# Patient Record
Sex: FEMALE | Race: WHITE | Employment: UNEMPLOYED | URBAN - METROPOLITAN AREA
[De-identification: names, ages, dates, MRNs, and addresses within clinical notes are randomized per-mention and may not be internally consistent; named-entity substitution may affect disease eponyms.]

---

## 2019-07-25 ENCOUNTER — APPOINTMENT (OUTPATIENT)
Dept: GENERAL RADIOLOGY | Age: 24
DRG: 720 | End: 2019-07-25
Attending: EMERGENCY MEDICINE
Payer: MEDICAID

## 2019-07-25 ENCOUNTER — HOSPITAL ENCOUNTER (INPATIENT)
Age: 24
LOS: 4 days | Discharge: LEFT BEFORE DISCHARGE | DRG: 720 | End: 2019-07-29
Attending: EMERGENCY MEDICINE | Admitting: FAMILY MEDICINE
Payer: MEDICAID

## 2019-07-25 DIAGNOSIS — L03.90 CELLULITIS, UNSPECIFIED CELLULITIS SITE: Primary | ICD-10-CM

## 2019-07-25 DIAGNOSIS — A41.9 SEPSIS, DUE TO UNSPECIFIED ORGANISM: ICD-10-CM

## 2019-07-25 LAB
ALBUMIN SERPL-MCNC: 3 G/DL (ref 3.4–5)
ALBUMIN/GLOB SERPL: 0.8 {RATIO} (ref 0.8–1.7)
ALP SERPL-CCNC: 145 U/L (ref 45–117)
ALT SERPL-CCNC: 27 U/L (ref 13–56)
AMPHET UR QL SCN: NEGATIVE
ANION GAP SERPL CALC-SCNC: 7 MMOL/L (ref 3–18)
APPEARANCE UR: CLEAR
APTT PPP: 29.3 SEC (ref 23–36.4)
AST SERPL-CCNC: 47 U/L (ref 10–38)
ATRIAL RATE: 103 BPM
BACTERIA URNS QL MICRO: ABNORMAL /HPF
BARBITURATES UR QL SCN: NEGATIVE
BASOPHILS # BLD: 0 K/UL (ref 0–0.1)
BASOPHILS NFR BLD: 0 % (ref 0–2)
BENZODIAZ UR QL: NEGATIVE
BILIRUB SERPL-MCNC: 0.9 MG/DL (ref 0.2–1)
BILIRUB UR QL: NEGATIVE
BUN SERPL-MCNC: 11 MG/DL (ref 7–18)
BUN/CREAT SERPL: 13 (ref 12–20)
CALCIUM SERPL-MCNC: 8.1 MG/DL (ref 8.5–10.1)
CALCULATED P AXIS, ECG09: 68 DEGREES
CALCULATED R AXIS, ECG10: 62 DEGREES
CALCULATED T AXIS, ECG11: 47 DEGREES
CANNABINOIDS UR QL SCN: NEGATIVE
CHLORIDE SERPL-SCNC: 92 MMOL/L (ref 100–111)
CO2 SERPL-SCNC: 29 MMOL/L (ref 21–32)
COCAINE UR QL SCN: NEGATIVE
COLOR UR: YELLOW
CREAT SERPL-MCNC: 0.86 MG/DL (ref 0.6–1.3)
DIAGNOSIS, 93000: NORMAL
DIFFERENTIAL METHOD BLD: ABNORMAL
EOSINOPHIL # BLD: 0 K/UL (ref 0–0.4)
EOSINOPHIL NFR BLD: 0 % (ref 0–5)
EPITH CASTS URNS QL MICRO: ABNORMAL /LPF (ref 0–5)
ERYTHROCYTE [DISTWIDTH] IN BLOOD BY AUTOMATED COUNT: 12.7 % (ref 11.6–14.5)
GLOBULIN SER CALC-MCNC: 3.8 G/DL (ref 2–4)
GLUCOSE SERPL-MCNC: 86 MG/DL (ref 74–99)
GLUCOSE UR STRIP.AUTO-MCNC: NEGATIVE MG/DL
HCT VFR BLD AUTO: 34.8 % (ref 35–45)
HDSCOM,HDSCOM: ABNORMAL
HGB BLD-MCNC: 11.8 G/DL (ref 12–16)
HGB UR QL STRIP: NEGATIVE
INR PPP: 1.2 (ref 0.8–1.2)
KETONES UR QL STRIP.AUTO: NEGATIVE MG/DL
LACTATE BLD-SCNC: 2.48 MMOL/L (ref 0.4–2)
LEUKOCYTE ESTERASE UR QL STRIP.AUTO: ABNORMAL
LYMPHOCYTES # BLD: 0.5 K/UL (ref 0.9–3.6)
LYMPHOCYTES NFR BLD: 6 % (ref 21–52)
MCH RBC QN AUTO: 28.2 PG (ref 24–34)
MCHC RBC AUTO-ENTMCNC: 33.9 G/DL (ref 31–37)
MCV RBC AUTO: 83.1 FL (ref 74–97)
METHADONE UR QL: NEGATIVE
MONOCYTES # BLD: 0.4 K/UL (ref 0.05–1.2)
MONOCYTES NFR BLD: 4 % (ref 3–10)
NEUTS SEG # BLD: 8.3 K/UL (ref 1.8–8)
NEUTS SEG NFR BLD: 90 % (ref 40–73)
NITRITE UR QL STRIP.AUTO: NEGATIVE
OPIATES UR QL: POSITIVE
P-R INTERVAL, ECG05: 114 MS
PCP UR QL: NEGATIVE
PH UR STRIP: 6.5 [PH] (ref 5–8)
PLATELET # BLD AUTO: 178 K/UL (ref 135–420)
PMV BLD AUTO: 9.8 FL (ref 9.2–11.8)
POTASSIUM SERPL-SCNC: 3.2 MMOL/L (ref 3.5–5.5)
PROT SERPL-MCNC: 6.8 G/DL (ref 6.4–8.2)
PROT UR STRIP-MCNC: NEGATIVE MG/DL
PROTHROMBIN TIME: 14.7 SEC (ref 11.5–15.2)
Q-T INTERVAL, ECG07: 316 MS
QRS DURATION, ECG06: 80 MS
QTC CALCULATION (BEZET), ECG08: 413 MS
RBC # BLD AUTO: 4.19 M/UL (ref 4.2–5.3)
RBC #/AREA URNS HPF: NEGATIVE /HPF (ref 0–5)
SODIUM SERPL-SCNC: 128 MMOL/L (ref 136–145)
SP GR UR REFRACTOMETRY: 1.01 (ref 1–1.03)
UROBILINOGEN UR QL STRIP.AUTO: 1 EU/DL (ref 0.2–1)
VENTRICULAR RATE, ECG03: 103 BPM
WBC # BLD AUTO: 9.3 K/UL (ref 4.6–13.2)
WBC URNS QL MICRO: ABNORMAL /HPF (ref 0–5)

## 2019-07-25 PROCEDURE — 85025 COMPLETE CBC W/AUTO DIFF WBC: CPT

## 2019-07-25 PROCEDURE — 85610 PROTHROMBIN TIME: CPT

## 2019-07-25 PROCEDURE — 81001 URINALYSIS AUTO W/SCOPE: CPT

## 2019-07-25 PROCEDURE — 74011250636 HC RX REV CODE- 250/636: Performed by: EMERGENCY MEDICINE

## 2019-07-25 PROCEDURE — 96361 HYDRATE IV INFUSION ADD-ON: CPT

## 2019-07-25 PROCEDURE — 71045 X-RAY EXAM CHEST 1 VIEW: CPT

## 2019-07-25 PROCEDURE — 65660000000 HC RM CCU STEPDOWN

## 2019-07-25 PROCEDURE — 74011250637 HC RX REV CODE- 250/637: Performed by: FAMILY MEDICINE

## 2019-07-25 PROCEDURE — 80053 COMPREHEN METABOLIC PANEL: CPT

## 2019-07-25 PROCEDURE — 87186 SC STD MICRODIL/AGAR DIL: CPT

## 2019-07-25 PROCEDURE — 74011000258 HC RX REV CODE- 258: Performed by: EMERGENCY MEDICINE

## 2019-07-25 PROCEDURE — 87077 CULTURE AEROBIC IDENTIFY: CPT

## 2019-07-25 PROCEDURE — 93005 ELECTROCARDIOGRAM TRACING: CPT

## 2019-07-25 PROCEDURE — 80307 DRUG TEST PRSMV CHEM ANLYZR: CPT

## 2019-07-25 PROCEDURE — 87040 BLOOD CULTURE FOR BACTERIA: CPT

## 2019-07-25 PROCEDURE — 75810000289 HC I&D ABSCESS SIMP/COMP/MULT

## 2019-07-25 PROCEDURE — 74011000250 HC RX REV CODE- 250: Performed by: EMERGENCY MEDICINE

## 2019-07-25 PROCEDURE — 99285 EMERGENCY DEPT VISIT HI MDM: CPT

## 2019-07-25 PROCEDURE — 96374 THER/PROPH/DIAG INJ IV PUSH: CPT

## 2019-07-25 PROCEDURE — 85730 THROMBOPLASTIN TIME PARTIAL: CPT

## 2019-07-25 PROCEDURE — 83605 ASSAY OF LACTIC ACID: CPT

## 2019-07-25 PROCEDURE — 74011250637 HC RX REV CODE- 250/637: Performed by: EMERGENCY MEDICINE

## 2019-07-25 RX ORDER — LEVOFLOXACIN 5 MG/ML
750 INJECTION, SOLUTION INTRAVENOUS EVERY 24 HOURS
Status: DISCONTINUED | OUTPATIENT
Start: 2019-07-25 | End: 2019-07-25

## 2019-07-25 RX ORDER — PERMETHRIN 50 MG/G
CREAM TOPICAL
Status: COMPLETED | OUTPATIENT
Start: 2019-07-25 | End: 2019-07-25

## 2019-07-25 RX ORDER — VANCOMYCIN/0.9 % SOD CHLORIDE 1.5G/250ML
1500 PLASTIC BAG, INJECTION (ML) INTRAVENOUS ONCE
Status: COMPLETED | OUTPATIENT
Start: 2019-07-25 | End: 2019-07-25

## 2019-07-25 RX ORDER — SODIUM CHLORIDE 9 MG/ML
150 INJECTION, SOLUTION INTRAVENOUS ONCE
Status: COMPLETED | OUTPATIENT
Start: 2019-07-25 | End: 2019-07-26

## 2019-07-25 RX ORDER — ACETAMINOPHEN 500 MG
1000 TABLET ORAL ONCE
Status: COMPLETED | OUTPATIENT
Start: 2019-07-25 | End: 2019-07-25

## 2019-07-25 RX ORDER — SODIUM CHLORIDE 0.9 % (FLUSH) 0.9 %
5-10 SYRINGE (ML) INJECTION AS NEEDED
Status: DISCONTINUED | OUTPATIENT
Start: 2019-07-25 | End: 2019-07-29 | Stop reason: HOSPADM

## 2019-07-25 RX ORDER — IBUPROFEN 400 MG/1
800 TABLET ORAL ONCE
Status: COMPLETED | OUTPATIENT
Start: 2019-07-25 | End: 2019-07-25

## 2019-07-25 RX ADMIN — SODIUM CHLORIDE 500 ML: 900 INJECTION, SOLUTION INTRAVENOUS at 19:47

## 2019-07-25 RX ADMIN — SODIUM CHLORIDE 1000 ML: 900 INJECTION, SOLUTION INTRAVENOUS at 18:07

## 2019-07-25 RX ADMIN — SODIUM CHLORIDE 150 ML/HR: 900 INJECTION, SOLUTION INTRAVENOUS at 20:24

## 2019-07-25 RX ADMIN — SODIUM CHLORIDE 40 ML: 9 INJECTION, SOLUTION INTRAVENOUS at 18:30

## 2019-07-25 RX ADMIN — WATER 1 G: 1 INJECTION INTRAMUSCULAR; INTRAVENOUS; SUBCUTANEOUS at 18:57

## 2019-07-25 RX ADMIN — PERMETHRIN: 50 CREAM TOPICAL at 22:30

## 2019-07-25 RX ADMIN — ACETAMINOPHEN 1000 MG: 500 TABLET ORAL at 18:13

## 2019-07-25 RX ADMIN — IBUPROFEN 800 MG: 400 TABLET, FILM COATED ORAL at 18:13

## 2019-07-25 RX ADMIN — VANCOMYCIN HYDROCHLORIDE 1500 MG: 10 INJECTION, POWDER, LYOPHILIZED, FOR SOLUTION INTRAVENOUS at 19:30

## 2019-07-25 NOTE — ED PROVIDER NOTES
EMERGENCY DEPARTMENT HISTORY AND PHYSICAL EXAM    Date: 7/25/2019  Patient Name: Roxana Frye    History of Presenting Illness     Chief Complaint   Patient presents with    Skin Problem    Chest Pain         History Provided By: Patient      Roxana Frye is a 25 y.o. female with PMHX of of cocaine skin popping who presents to the emergency department C/O with fever tachycardia and multiple sores all of her body. Patient notes that she has felt bugs crawling all over her body the last few days and has been picking at them she notes that she noticed redness and swelling over her right arm where she has skin popped before. She is concerned there may be an abscess forming. She has a slight headache she denies changes in vision she notes slight shortness of breath she denies chest pain she denies nausea or vomiting or urinary symptoms she notes that she has these lesions all over both arms chest and legs were ever she could pick it the \"black dots\". PCP: None        Past History     Past Medical History:  History reviewed. No pertinent past medical history. Past Surgical History:  History reviewed. No pertinent surgical history. Family History:  History reviewed. No pertinent family history. Social History:  Social History     Tobacco Use    Smoking status: Current Every Day Smoker    Smokeless tobacco: Current User   Substance Use Topics    Alcohol use: Yes    Drug use: Not on file       Allergies:  No Known Allergies      Review of Systems   Review of Systems   All other systems reviewed and are negative.         Physical Exam     Vitals:    07/25/19 1835 07/25/19 1839 07/25/19 1900 07/25/19 1922   BP: 111/65  100/51    Pulse: (!) 104 (!) 105 (!) 102    Resp: 26 21 16    Temp:    100.1 °F (37.8 °C)   SpO2: 99%      Weight:       Height:         Physical Exam    Nursing notes and vital signs reviewed    Constitutional: moderate acute distress  Head: Normocephalic, Atraumatic  Eyes: Pupils are equal, round, and reactive to light, EOMI  Neck: Supple  Cardiovascular: tachycardic, no murmurs, rubs, or gallops  Chest: Normal work of breathing and chest excursion bilaterally  Lungs: Clear to ausculation bilaterally  Abdomen: Soft, non tender, non distended,  Back: No evidence of trauma or deformity  Extremities: no LE edema  Skin: Multiple lesions of skin breakage and redness on bilateral upper extremities bilateral lower extremities face chest and groin. Many becoming cellulitic worst area of cellulitis on the right arm with small possible abscess. Neuro: Alert and appropriate  Psychiatric: Anxious and jittery      Diagnostic Study Results     Labs -     Recent Results (from the past 12 hour(s))   EKG, 12 LEAD, INITIAL    Collection Time: 07/25/19  6:04 PM   Result Value Ref Range    Ventricular Rate 103 BPM    Atrial Rate 103 BPM    P-R Interval 114 ms    QRS Duration 80 ms    Q-T Interval 316 ms    QTC Calculation (Bezet) 413 ms    Calculated P Axis 68 degrees    Calculated R Axis 62 degrees    Calculated T Axis 47 degrees    Diagnosis       Poor data quality, interpretation may be adversely affected  Sinus tachycardia  Otherwise normal ECG  Confirmed by Kelly Cai MD, [facility] ([id number]) on 7/25/2019 2:13:51 PM     METABOLIC PANEL, COMPREHENSIVE    Collection Time: 07/25/19  6:07 PM   Result Value Ref Range    Sodium 128 (L) 136 - 145 mmol/L    Potassium 3.2 (L) 3.5 - 5.5 mmol/L    Chloride 92 (L) 100 - 111 mmol/L    CO2 29 21 - 32 mmol/L    Anion gap 7 3.0 - 18 mmol/L    Glucose 86 74 - 99 mg/dL    BUN 11 7.0 - 18 MG/DL    Creatinine 0.86 0.6 - 1.3 MG/DL    BUN/Creatinine ratio 13 12 - 20      GFR est AA >60 >60 ml/min/1.73m2    GFR est non-AA >60 >60 ml/min/1.73m2    Calcium 8.1 (L) 8.5 - 10.1 MG/DL    Bilirubin, total 0.9 0.2 - 1.0 MG/DL    ALT (SGPT) 27 13 - 56 U/L    AST (SGOT) 47 (H) 10 - 38 U/L    Alk.  phosphatase 145 (H) 45 - 117 U/L    Protein, total 6.8 6.4 - 8.2 g/dL    Albumin 3.0 (L) 3.4 - 5.0 g/dL Globulin 3.8 2.0 - 4.0 g/dL    A-G Ratio 0.8 0.8 - 1.7     CBC WITH AUTOMATED DIFF    Collection Time: 07/25/19  6:07 PM   Result Value Ref Range    WBC 9.3 4.6 - 13.2 K/uL    RBC 4.19 (L) 4.20 - 5.30 M/uL    HGB 11.8 (L) 12.0 - 16.0 g/dL    HCT 34.8 (L) 35.0 - 45.0 %    MCV 83.1 74.0 - 97.0 FL    MCH 28.2 24.0 - 34.0 PG    MCHC 33.9 31.0 - 37.0 g/dL    RDW 12.7 11.6 - 14.5 %    PLATELET 766 308 - 934 K/uL    MPV 9.8 9.2 - 11.8 FL    NEUTROPHILS 90 (H) 40 - 73 %    LYMPHOCYTES 6 (L) 21 - 52 %    MONOCYTES 4 3 - 10 %    EOSINOPHILS 0 0 - 5 %    BASOPHILS 0 0 - 2 %    ABS. NEUTROPHILS 8.3 (H) 1.8 - 8.0 K/UL    ABS. LYMPHOCYTES 0.5 (L) 0.9 - 3.6 K/UL    ABS. MONOCYTES 0.4 0.05 - 1.2 K/UL    ABS. EOSINOPHILS 0.0 0.0 - 0.4 K/UL    ABS.  BASOPHILS 0.0 0.0 - 0.1 K/UL    DF AUTOMATED     PROTHROMBIN TIME + INR    Collection Time: 07/25/19  6:07 PM   Result Value Ref Range    Prothrombin time 14.7 11.5 - 15.2 sec    INR 1.2 0.8 - 1.2     PTT    Collection Time: 07/25/19  6:07 PM   Result Value Ref Range    aPTT 29.3 23.0 - 36.4 SEC   POC LACTIC ACID    Collection Time: 07/25/19  6:09 PM   Result Value Ref Range    Lactic Acid (POC) 2.48 (HH) 0.40 - 2.00 mmol/L       Radiologic Studies -   XR CHEST PORT    (Results Pending)     CT Results  (Last 48 hours)    None        CXR Results  (Last 48 hours)    None          Medications given in the ED-  Medications   sodium chloride (NS) flush 5-10 mL (has no administration in time range)   cefTRIAXone (ROCEPHIN) 1 g in sterile water (preservative free) 10 mL IV syringe (1 g IntraVENous Push 7/25/19 1857)   vancomycin (VANCOCIN) 1500 mg in  ml infusion (1,500 mg IntraVENous New Bag 7/25/19 1930)   sodium chloride 0.9 % bolus infusion 500 mL (500 mL IntraVENous New Bag 7/25/19 1947)   0.9% sodium chloride infusion (has no administration in time range)   vancomycin (VANCOCIN) 1,000 mg in 0.9% sodium chloride 250 mL IVPB (has no administration in time range)   sodium chloride 0.9 % bolus infusion 1,000 mL (0 mL IntraVENous IV Completed 7/25/19 1845)     Followed by   sodium chloride 0.9 % bolus infusion 1,000 mL (0 mL IntraVENous IV Completed 7/25/19 1855)     Followed by   sodium chloride 0.9 % bolus infusion 40 mL (40 mL IntraVENous Push 7/25/19 1830)   acetaminophen (TYLENOL) tablet 1,000 mg (1,000 mg Oral Given 7/25/19 1813)   ibuprofen (MOTRIN) tablet 800 mg (800 mg Oral Given 7/25/19 1813)         Medical Decision Making   I am the first provider for this patient. I reviewed the vital signs, available nursing notes, past medical history, past surgical history, family history and social history. Vital Signs-Reviewed the patient's vital signs. Records Reviewed: Nursing Notes    Provider Notes (Medical Decision Making): Junior Lynne is a 25 y.o. female with a history of cocaine skin popping who presented today septic. The patient was brought back and evaluated she had evidence of cellulitis on bilateral upper extremities and lower extremities worse area in the right upper extremity. There was an area of possible fluctuance. Patient was started on the septic bundle lactate was elevated blood cultures were sent x-ray showed no lobar pneumonia. She was started on antibiotics including Vanco and ceftriaxone. A 30 cc/kg bolus was ordered. Patient's vitals started to improve she was given Tylenol and Motrin for her fever she will be admitted to telemetry to Dr. Chaya Sanchez for further management.     I appreciated the help of Dr. Danitza Smalls in the care of this patient for the I&D    Procedures:  I&D Abcess Simple  Date/Time: 7/25/2019 8:16 PM  Performed by: Rob Farias DO  Authorized by: Rob Farias DO     Consent:     Consent obtained:  Verbal    Consent given by:  Patient    Risks discussed:  Bleeding, incomplete drainage, pain, infection and damage to other organs    Alternatives discussed:  No treatment  Location:     Type:  Abscess Location:  Upper extremity    Upper extremity location:  Arm    Arm location:  R lower arm  Pre-procedure details:     Skin preparation:  Chloraprep  Anesthesia (see MAR for exact dosages): Anesthesia method:  Local infiltration    Local anesthetic:  Lidocaine 1% w/o epi  Procedure type:     Complexity:  Simple  Procedure details:     Needle aspiration: no      Incision types:  Stab incision and cruciate    Incision depth:  Subcutaneous    Scalpel blade:  11    Wound management:  Probed and deloculated and irrigated with saline    Drainage:  Bloody and purulent    Drainage amount: Moderate    Packing materials:  None  Post-procedure details:     Patient tolerance of procedure: Tolerated well, no immediate complications            Diagnosis and Disposition         Core Measures:  For Hospitalized Patients:    1. Hospitalization Decision Time:  The decision to hospitalize the patient was made by Milana Matthews at 1900 on 7/25/2019    2. Aspirin: Aspirin was not given because the patient did not present with a stroke at the time of their Emergency Department evaluation    8:06 PM  Patient is being admitted to the hospital by Ashok Raya. The results of their tests and reasons for their admission have been discussed with them and/or available family. They convey agreement and understanding for the need to be admitted and for their admission diagnosis. CONDITIONS ON ADMISSION:  Sepsis is present at the time of admission. Deep Vein Thrombosis is not present at the time of admission. Thrombosis is not present at the time of admission. Pneumonia is not present at the time of admission. MRSA is not present at the time of admission. Wound infection is present at the time of admission. Pressure Ulcer is not present at the time of admission. CLINICAL IMPRESSION:    1. Cellulitis, unspecified cellulitis site    2.  Sepsis, due to unspecified organism ([facility]      _______________________________      Please note that this dictation was completed with [employer], the [employer] voice recognition software. Quite often unanticipated grammatical, syntax, homophones, and other interpretive errors are inadvertently transcribed by the computer software. Please disregard these errors. Please excuse any errors that have escaped final proofreading.

## 2019-07-25 NOTE — PROGRESS NOTES
Pharmacy Dosing Services:     Consult for Vancomycin Dosing by Pharmacy by Dr. Rony Colin,  Consult provided for this 25y.o. year old female , for indication of skin/soft tissue infection. Day of Therapy 1    Ht Readings from Last 1 Encounters:   07/25/19 165.1 cm (65\")        Wt Readings from Last 1 Encounters:   07/25/19 68 kg (150 lb)        Previous Regimen N/A   Last Level N/A   Other Current Antibiotics Ceftriaxone 1 GM Q24H   Significant Cultures pending   Serum Creatinine Lab Results   Component Value Date/Time    Creatinine 0.86 07/25/2019 06:07 PM        Creatinine Clearance Estimated Creatinine Clearance: 90.8 mL/min (based on SCr of 0.86 mg/dL). BUN Lab Results   Component Value Date/Time    BUN 11 07/25/2019 06:07 PM        WBC Lab Results   Component Value Date/Time    WBC 9.3 07/25/2019 06:07 PM        H/H Lab Results   Component Value Date/Time    HGB 11.8 (L) 07/25/2019 06:07 PM        Platelets Lab Results   Component Value Date/Time    PLATELET 335 10/69/0435 06:07 PM        Temp 100.1 °F (37.8 °C)     Start Vancomycin therapy, with loading dose of 1500 (mg) at 19:30 on 7/25/2019 (time/date). Follow with maintenance dose of 1000(mg) at 07:30/19:30 (time/date), every 12  hours (frequency). Dose calculated to approximate a therapeutic trough of 15-20 mcg/mL. Pharmacy to follow daily and will make changes to dose and/or frequency based on clinical status.       Pharmacist Jm Rosales, [facility]

## 2019-07-25 NOTE — ED TRIAGE NOTES
Patient with abscess to the right arm. Patient also with marks covering her entire body from picking.

## 2019-07-26 LAB
ANION GAP SERPL CALC-SCNC: 7 MMOL/L (ref 3–18)
BUN SERPL-MCNC: 10 MG/DL (ref 7–18)
BUN/CREAT SERPL: 14 (ref 12–20)
CALCIUM SERPL-MCNC: 7.1 MG/DL (ref 8.5–10.1)
CHLORIDE SERPL-SCNC: 107 MMOL/L (ref 100–111)
CK MB CFR SERPL CALC: 0.6 % (ref 0–4)
CK MB SERPL-MCNC: 1.8 NG/ML (ref 5–25)
CK SERPL-CCNC: 298 U/L (ref 26–192)
CO2 SERPL-SCNC: 24 MMOL/L (ref 21–32)
CREAT SERPL-MCNC: 0.73 MG/DL (ref 0.6–1.3)
GLUCOSE SERPL-MCNC: 109 MG/DL (ref 74–99)
LACTATE SERPL-SCNC: 1.4 MMOL/L (ref 0.4–2)
POTASSIUM SERPL-SCNC: 3.3 MMOL/L (ref 3.5–5.5)
SODIUM SERPL-SCNC: 138 MMOL/L (ref 136–145)
TROPONIN I SERPL-MCNC: <0.02 NG/ML (ref 0–0.04)

## 2019-07-26 PROCEDURE — 82550 ASSAY OF CK (CPK): CPT

## 2019-07-26 PROCEDURE — 74011250636 HC RX REV CODE- 250/636: Performed by: EMERGENCY MEDICINE

## 2019-07-26 PROCEDURE — 74011250636 HC RX REV CODE- 250/636: Performed by: FAMILY MEDICINE

## 2019-07-26 PROCEDURE — 77030011256 HC DRSG MEPILEX <16IN NO BORD MOLN -A

## 2019-07-26 PROCEDURE — 74011250637 HC RX REV CODE- 250/637: Performed by: FAMILY MEDICINE

## 2019-07-26 PROCEDURE — 36415 COLL VENOUS BLD VENIPUNCTURE: CPT

## 2019-07-26 PROCEDURE — 80048 BASIC METABOLIC PNL TOTAL CA: CPT

## 2019-07-26 PROCEDURE — C9113 INJ PANTOPRAZOLE SODIUM, VIA: HCPCS | Performed by: FAMILY MEDICINE

## 2019-07-26 PROCEDURE — 83605 ASSAY OF LACTIC ACID: CPT

## 2019-07-26 PROCEDURE — 65660000000 HC RM CCU STEPDOWN

## 2019-07-26 PROCEDURE — 74011000250 HC RX REV CODE- 250: Performed by: FAMILY MEDICINE

## 2019-07-26 RX ORDER — OXYCODONE AND ACETAMINOPHEN 5; 325 MG/1; MG/1
1 TABLET ORAL
Status: DISCONTINUED | OUTPATIENT
Start: 2019-07-26 | End: 2019-07-29 | Stop reason: HOSPADM

## 2019-07-26 RX ORDER — SODIUM CHLORIDE 9 MG/ML
150 INJECTION, SOLUTION INTRAVENOUS ONCE
Status: COMPLETED | OUTPATIENT
Start: 2019-07-26 | End: 2019-07-26

## 2019-07-26 RX ORDER — NICOTINE 7MG/24HR
1 PATCH, TRANSDERMAL 24 HOURS TRANSDERMAL DAILY
Status: DISCONTINUED | OUTPATIENT
Start: 2019-07-26 | End: 2019-07-29 | Stop reason: HOSPADM

## 2019-07-26 RX ADMIN — OXYCODONE HYDROCHLORIDE AND ACETAMINOPHEN 1 TABLET: 5; 325 TABLET ORAL at 17:38

## 2019-07-26 RX ADMIN — VANCOMYCIN HYDROCHLORIDE 1000 MG: 1 INJECTION, POWDER, LYOPHILIZED, FOR SOLUTION INTRAVENOUS at 07:48

## 2019-07-26 RX ADMIN — PANTOPRAZOLE SODIUM 40 MG: 40 INJECTION, POWDER, LYOPHILIZED, FOR SOLUTION INTRAVENOUS at 08:44

## 2019-07-26 RX ADMIN — OXYCODONE HYDROCHLORIDE AND ACETAMINOPHEN 1 TABLET: 5; 325 TABLET ORAL at 00:55

## 2019-07-26 RX ADMIN — SODIUM CHLORIDE 150 ML/HR: 900 INJECTION, SOLUTION INTRAVENOUS at 05:06

## 2019-07-26 RX ADMIN — VANCOMYCIN HYDROCHLORIDE 1000 MG: 1 INJECTION, POWDER, LYOPHILIZED, FOR SOLUTION INTRAVENOUS at 22:11

## 2019-07-26 RX ADMIN — OXYCODONE HYDROCHLORIDE AND ACETAMINOPHEN 1 TABLET: 5; 325 TABLET ORAL at 07:46

## 2019-07-26 RX ADMIN — CEFTRIAXONE 1 G: 1 INJECTION, POWDER, FOR SOLUTION INTRAMUSCULAR; INTRAVENOUS at 17:38

## 2019-07-26 NOTE — H&P
History & Physical    Patient: Junior Lynne MRN: [id number]  CSN: [id number]    YOB: 1995  Age: 25 y.o. Sex: female      DOA: 7/25/2019  Primary Care Provider:  None      Assessment/Plan     Patient Active Problem List   Diagnosis Code    Cellulitis L03.90    Sepsis ([facility] A41.9       24 y/o female who has been skin popping cocaine for 2 years is admitted for R forearm abscess and cellulitis with hypotension possibly due to sepsis. S/p I/D in ER. She also has scabies vs. bedbug infestation. Will do screening troponin to ensure neg given tobacco use and cocaine use although her chest wall pain is reproducible.    -treat with rocephin and vancomycin  -percocet prn for abscess pain  -liberal fluid hydration, monitor MAPs and urine output closely  -nicotine patch as pt is actively smoking  -permethrin cream from the neck down after shower tonight, wash off in AM  -f/u troponin    Prophy-SCDs, protonix, no heparin due to bleeding from I/D site    Estimated length of stay : 2 nights    Hanna Gonzales MD  Nocturnist    ------------------------------------------------------------------------------------------------------------------------------------    CC: R forearm infection       HPI:     Junior Lynne is a 25 y.o. female who endorses a history of skin popping cocaine for 2 years. Denies other drug use. Worsening infection in R forearm over the course of the week and also has had bedbug or scabies infection. No prior hx of staph infection/abscess. Has had fever today. Is s/p I/D of R forearm abscess in ER today. She endorses pain with palpation of her R lower ribcage but denies any trauma. History reviewed. No pertinent past medical history. History reviewed. No pertinent surgical history. History reviewed. No pertinent family history.     Social History     Socioeconomic History    Marital status: SINGLE     Spouse name: Not on file    Number of children: Not on file    Years of education: Not on file    Highest education level: Not on file   Tobacco Use    Smoking status: Current Every Day Smoker    Smokeless tobacco: Current User   Substance and Sexual Activity    Alcohol use: Yes       Prior to Admission medications    Not on File       No Known Allergies    Review of Systems  Gen: No fever, chills, malaise, weight loss/gain. Heent: No headache, rhinorrhea, epistaxis, ear pain, hearing loss, sinus pain, neck pain/stiffness, sore throat. Heart: +chest wall pain with palpation; denies palpitations, VIDALES, pnd, or orthopnea. Resp: No cough, hemoptysis, wheezing and shortness of breath. GI: No nausea, vomiting, diarrhea, constipation, melena or hematochezia. : No urinary obstruction, dysuria or hematuria. Derm: No rash, new skin lesion or pruritis. Musc/skeletal: no bone or joint complaints. Vasc: No edema, cyanosis or claudication. Endo: No heat/cold intolerance, no polyuria,polydipsia or polyphagia. Neuro: No unilateral weakness, numbness, tingling. No seizures. Heme: No easy bruising or bleeding. Physical Exam:     Physical Exam:  Visit Vitals  BP 90/42 (BP 1 Location: Left arm, BP Patient Position: Sitting)   Pulse 88   Temp 97.8 °F (36.6 °C)   Resp 18   Ht 5' 5\" (1.651 m)   Wt 68 kg (150 lb)   LMP 2019   SpO2 98%   BMI 24.96 kg/m²      O2 Device: Room air    Temp (24hrs), Av.7 °F (37.6 °C), Min:97.8 °F (36.6 °C), Max:101.3 °F (38.5 °C)    1901 -  0700  In: 650 [I.V.:650]  Out: 500 [Urine:500]   701 - 1900  In: 2000 [I.V.:2000]  Out: -     General:  Awake, cooperative, no distress, covered in scabs from face to feet. Head:  Normocephalic, without obvious abnormality, atraumatic. Eyes:  Conjunctivae/corneas clear, sclera anicteric, PERRL. Neck: Supple, symmetrical, trachea midline, no adenopathy. Lungs:   Clear to auscultation bilaterally.        Heart:  Regular rate and rhythm, S1, S2 normal, no murmur, click, rub or gallop. Chest wall with tenderness to palpation over R inferior side. Abdomen: Soft, non-tender. Bowel sounds normal. No masses,  No organomegaly. Extremities: Extremities normal, atraumatic, no cyanosis or edema. Capillary refill normal.   Pulses: 2+ and symmetric all extremities. Skin: Skin color pink, turgor normal. Covered in excoriations/scabs from face to feet. Pressure wrap in place on R forearm due to oozing from abscess I/D   Neurologic: No focal motor or sensory deficit. Labs Reviewed: All lab results for the last 24 hours reviewed. Recent Results (from the past 24 hour(s))   EKG, 12 LEAD, INITIAL    Collection Time: 07/25/19  6:04 PM   Result Value Ref Range    Ventricular Rate 103 BPM    Atrial Rate 103 BPM    P-R Interval 114 ms    QRS Duration 80 ms    Q-T Interval 316 ms    QTC Calculation (Bezet) 413 ms    Calculated P Axis 68 degrees    Calculated R Axis 62 degrees    Calculated T Axis 47 degrees    Diagnosis       Poor data quality, interpretation may be adversely affected  Sinus tachycardia  Otherwise normal ECG  Confirmed by Derik Carrillo MD, [facility] ([id number]) on 7/25/2019 8:50:74 PM     METABOLIC PANEL, COMPREHENSIVE    Collection Time: 07/25/19  6:07 PM   Result Value Ref Range    Sodium 128 (L) 136 - 145 mmol/L    Potassium 3.2 (L) 3.5 - 5.5 mmol/L    Chloride 92 (L) 100 - 111 mmol/L    CO2 29 21 - 32 mmol/L    Anion gap 7 3.0 - 18 mmol/L    Glucose 86 74 - 99 mg/dL    BUN 11 7.0 - 18 MG/DL    Creatinine 0.86 0.6 - 1.3 MG/DL    BUN/Creatinine ratio 13 12 - 20      GFR est AA >60 >60 ml/min/1.73m2    GFR est non-AA >60 >60 ml/min/1.73m2    Calcium 8.1 (L) 8.5 - 10.1 MG/DL    Bilirubin, total 0.9 0.2 - 1.0 MG/DL    ALT (SGPT) 27 13 - 56 U/L    AST (SGOT) 47 (H) 10 - 38 U/L    Alk.  phosphatase 145 (H) 45 - 117 U/L    Protein, total 6.8 6.4 - 8.2 g/dL    Albumin 3.0 (L) 3.4 - 5.0 g/dL    Globulin 3.8 2.0 - 4.0 g/dL    A-G Ratio 0.8 0.8 - 1.7     CBC WITH AUTOMATED DIFF    Collection Time: 07/25/19  6:07 PM   Result Value Ref Range    WBC 9.3 4.6 - 13.2 K/uL    RBC 4.19 (L) 4.20 - 5.30 M/uL    HGB 11.8 (L) 12.0 - 16.0 g/dL    HCT 34.8 (L) 35.0 - 45.0 %    MCV 83.1 74.0 - 97.0 FL    MCH 28.2 24.0 - 34.0 PG    MCHC 33.9 31.0 - 37.0 g/dL    RDW 12.7 11.6 - 14.5 %    PLATELET 891 845 - 520 K/uL    MPV 9.8 9.2 - 11.8 FL    NEUTROPHILS 90 (H) 40 - 73 %    LYMPHOCYTES 6 (L) 21 - 52 %    MONOCYTES 4 3 - 10 %    EOSINOPHILS 0 0 - 5 %    BASOPHILS 0 0 - 2 %    ABS. NEUTROPHILS 8.3 (H) 1.8 - 8.0 K/UL    ABS. LYMPHOCYTES 0.5 (L) 0.9 - 3.6 K/UL    ABS. MONOCYTES 0.4 0.05 - 1.2 K/UL    ABS. EOSINOPHILS 0.0 0.0 - 0.4 K/UL    ABS.  BASOPHILS 0.0 0.0 - 0.1 K/UL    DF AUTOMATED     PROTHROMBIN TIME + INR    Collection Time: 07/25/19  6:07 PM   Result Value Ref Range    Prothrombin time 14.7 11.5 - 15.2 sec    INR 1.2 0.8 - 1.2     PTT    Collection Time: 07/25/19  6:07 PM   Result Value Ref Range    aPTT 29.3 23.0 - 36.4 SEC   POC LACTIC ACID    Collection Time: 07/25/19  6:09 PM   Result Value Ref Range    Lactic Acid (POC) 2.48 (HH) 0.40 - 2.00 mmol/L   URINALYSIS W/ RFLX MICROSCOPIC    Collection Time: 07/25/19  8:33 PM   Result Value Ref Range    Color YELLOW      Appearance CLEAR      Specific gravity 1.008 1.005 - 1.030      pH (UA) 6.5 5.0 - 8.0      Protein NEGATIVE  NEG mg/dL    Glucose NEGATIVE  NEG mg/dL    Ketone NEGATIVE  NEG mg/dL    Bilirubin NEGATIVE  NEG      Blood NEGATIVE  NEG      Urobilinogen 1.0 0.2 - 1.0 EU/dL    Nitrites NEGATIVE  NEG      Leukocyte Esterase MODERATE (A) NEG     DRUG SCREEN, URINE    Collection Time: 07/25/19  8:33 PM   Result Value Ref Range    BENZODIAZEPINES NEGATIVE  NEG      BARBITURATES NEGATIVE  NEG      THC (TH-CANNABINOL) NEGATIVE  NEG      OPIATES POSITIVE (A) NEG      PCP(PHENCYCLIDINE) NEGATIVE  NEG      COCAINE NEGATIVE  NEG      AMPHETAMINES NEGATIVE  NEG      METHADONE NEGATIVE  NEG      HDSCOM (NOTE)    URINE MICROSCOPIC ONLY    Collection Time: 07/25/19 8:33 PM   Result Value Ref Range    WBC 0 to 3 0 - 5 /hpf    RBC NEGATIVE  0 - 5 /hpf    Epithelial cells FEW 0 - 5 /lpf    Bacteria FEW (A) NEG /hpf       CXR pending

## 2019-07-26 NOTE — CDMP QUERY
This patient has been diagnosed with Sepsis. Please explicitly link all related/associated Acute Organ Dysfunction that supports this diagnosis by documenting \"associated with\".     =>Hyperlactatemia associated with sepsis  =>Other explanation of clinical findings  =>Clinically Undetermined (no explanation for clinical findings)    Risk Factors: Sepsis    Clinical Indicators: POC lactic acid 2.48; serum lactic acid after Abx 1.4    Treatment: Vanco; Rocephin      Explicitly link all related/associated Acute Organ Dysfunction to Sepsis:      Encephalopathy  Sepsis-induced Hypotension (or)  Septic Shock           [SBP < 90 (or) MAP <65 (or) SBP drop > 40 points from baseline]  Hypoxemia (or)  Acute Respiratory Failure            [need for invasive or non-invasive ventilation OR- pO2 < 60 mmHg]  Acute Kidney Injury (or) Acute Renal Failure OR- Oliguria             [serum Creatinine > 2.0 OR- Urine Output < 0.5ml/kg/hr for 2 hours]  Ileus (absent bowel sounds)  Coagulopathy  DIC  Thrombocytopenia             [Platelet Count < 899,552 OR- INR > 1.5 OR- aPTT >60 sec]  Hyperbilirubinemia     [Bilirubin > 2 mg/dL]  Hyperlactatemia   [Lactic Acid > 2.0]  Critical-Illness Myopathy or Polyneuropathy    Severe Sepsis = Sepsis with associated Acute Organ Dysfunction    Noris Jean RN/CCDS  [phone]

## 2019-07-26 NOTE — CDMP QUERY
Patient admitted with sepsis, noted to have a serum sodium level of 128.  If possible, please document in progress notes and d/c summary if you are evaluating and/or treating any of the following:     Hyponatremia   Other explanation-please specify   Unable to Determine    The medical record reflects the following:    Risk Factors:  Acute illness    Clinical Indicators: Serum sodium of 128    Treatment: NS @ 859 mL/hr; metabolic panel ordered    Yvonne Hernandez RN/CCDS  [phone]

## 2019-07-26 NOTE — PROGRESS NOTES
INITIAL NUTRITION ASSESSMENT     RECOMMENDATIONS/PLAN:   MST screen error  Monitor labs/lytes, PO intakes, skin integrity, wt, fluid status, BM    REASON FOR ASSESSMENT:     []  RN Referral:    [x] MST score >/=2  Malnutrition Screening Tool (MST):  Recently Lost Weight Without Trying: No  If Yes, How Much Weight Loss: Unsure     MST Score: 2         NUTRITION ASSESSMENT:   Client History: 25 yrs old Female admitted with sepsis w/ GPC cluster bacteremia 2/2 abscess from skip popping, scabies, cocaine abuse      PMHx: cellulitis, sepsis    Cultural/Yazidi Food Preferences: None Identified    FOOD/NUTRITION HISTORY  Diet History: pt is eating junk food in room per nursing report    Food Allergies:  [x] NKFA      Pertinent PTA Medications: none on file     NUTRITION INTAKE   Diet Order:  Regular     Average PO Intake:       No data found. Pertinent Medications:  [x] Reviewed; protonix   Electrolyte Replacement Protocol: []K  []Mg  []PO4    Insulin:  [] SSI  [] Pre-meal   []  Basal   [] Drip  [] None  Pt expected to meet estimated nutrient needs through next review:          [x]  Yes     [] No;  ANTHROPOMETRICS  Height: 5' 5\" (165.1 cm)       Weight: 68.6 kg (151 lb 3.8 oz)    BMI: 25.2 kg/m^2  -  overweight (25.0%-29.9% BMI)        Weight change: no wt hx in chart                                   Comparison to Reference Standards:  IBW: 125 lbs      %IBW: 121%      AdjBW:n/a    NUTRITION-FOCUSED PHYSICAL ASSESSMENT  Skin: No PU. GI: No BM    BIOCHEMICAL DATA & MEDICAL TESTS  Pertinent Labs:  [x] Reviewed;     NUTRITION PRESCRIPTION  Calories: 2072 kcal/day based on [place] x 1.8  Protein:  g/day based on 1.2-1.5 g/kg  Fluid: 2072 ml/day based on 1 kcal/ml      NUTRITION DIAGNOSES:   1. No nutritional problems at this time     NUTRITION INTERVENTIONS:   INTERVENTIONS:        GOALS:  1. Other: continue w/ POC 1.  Encourage PO intake >50% at most meals by next review 7 days     LEARNING NEEDS (Diet, Supplementation, Food/Nutrient-Drug Interaction):   [] None Identified  [] Inpatient education provided/documented    [] Identified and patient:  [] Declined     [x] Was not appropriate/indicated  NUTRITION MONITORING /EVALUATION:   Follow PO intake  Monitor wt  Monitor renal labs, electrolytes, fluid status  Monitor for additional supplement needs    [] Participated in Interdisciplinary Rounds  [x] Interdisciplinary Care Plan Reviewed/Documented  DISCHARGE NUTRITION RECOMMENDATIONS ADDRESSED:     [x] Yes- recommended Regular diet     NUTRITION RISK:     [x]  At risk                     []  Not currently at risk     Will follow-up per policy.   Chyna Gayle 1

## 2019-07-26 NOTE — PROGRESS NOTES
Reason for Admission:   Sepsis, scabies, cocaine abuse. RRAT Score:          4           Plan for utilizing home health:      tbd                    Current Advanced Directive/Advance Care Plan:                          Transition of Care Plan:    Chart reviewed, spoke with pt on phone in room, noting isolation for scabies vs bed bug infestation. Pt admitted from home, lives in apartment with room mate, mother lives in [place]. Pt has been screened and has Medicaid application pending. Pt will need PCP at discharge, will follow for additional needs. Care Management Interventions  PCP Verified by CM:  Yes  Transition of Care Consult (CM Consult): Discharge Planning  Current Support Network: Own Home  Confirm Follow Up Transport: Friends  Plan discussed with Pt/Family/Caregiver: Yes  Discharge Location  Discharge Placement: Home

## 2019-07-26 NOTE — PROGRESS NOTES
Bedside and Verbal shift change report given to Debra Dorantes (oncoming nurse) by Ezra Neri RN   (offgoing nurse).  Report included the following information SBAR, Kardex, Intake/Output, MAR, Recent Results and Cardiac Rhythm st.

## 2019-07-26 NOTE — PROGRESS NOTES
Assumed care of patient in wheelchair,alert and in no distress. Placed in contact isolation, Complaining of right arm pain and chest pain, call bell within reach. 11:11 PM  Talking on phone PIV restarted, patient pulled first 2 piv's out, iv solution restarted. 4:55 AM   Awake quiet, remains on room air, minimal drainage from wound site. Dressing change. MD notified of IV fluids completion.  Patient states pain remains 7/10

## 2019-07-26 NOTE — ROUTINE PROCESS
TRANSFER - IN REPORT:    Verbal report received from [address] RN(name) on [address]  being received from emergency(unit) for routine progression of care      Report consisted of patients Situation, Background, Assessment and   Recommendations(SBAR). Information from the following report(s) SBAR, ED Summary, Intake/Output, MAR, Recent Results and Cardiac Rhythm sinus tach was reviewed with the receiving nurse. Opportunity for questions and clarification was provided. Assessment  Will be completed upon patients arrival to unit and care assumed.

## 2019-07-26 NOTE — ROUTINE PROCESS
TRANSFER - OUT REPORT:    Verbal report given to Virgilio Chaparro RN(name) on Jacque Mclaughlin  being transferred to Tele(unit) for routine progression of care       Report consisted of patients Situation, Background, Assessment and   Recommendations(SBAR). Information from the following report(s) SBAR, ED Summary, Procedure Summary, Intake/Output, MAR, Recent Results and Cardiac Rhythm sinus Tach was reviewed with the receiving nurse. Lines:   Peripheral IV 07/25/19 Left Antecubital (Active)       Peripheral IV 07/25/19 Left Wrist (Active)        Opportunity for questions and clarification was provided. Informed receiving that Pt is to shower upon arrival to Unit and then pt to apply permethrin cream to body per Dr Leif Wheat instructions.   Patient transported with:   Monitor  Tech

## 2019-07-26 NOTE — PROGRESS NOTES
Patient remains in isolation with hat & booties applied with gown. Patient educated on  possible spread of bugs.

## 2019-07-26 NOTE — PROGRESS NOTES
Summary Progress Note:  Remains on IV fluid, blood pressure improving, afebrile. Continues to c/o pain but able to rest and talk on cell phone. No s/s of acute distress or discomfort. Call bell within reach.   Patient Vitals for the past 12 hrs:   Temp Pulse Resp BP SpO2   07/26/19 0343 97.4 °F (36.3 °C) 79 18 95/56 100 %   07/25/19 2312 97.8 °F (36.6 °C) 88 18 90/42 98 %   07/25/19 2124 -- -- -- 109/52 --   07/25/19 2119 99.1 °F (37.3 °C) 94 20 (!) 100/39 98 %   07/25/19 2015 100.1 °F (37.8 °C) (!) 104 17 (!) 114/92 99 %   07/25/19 1922 100.1 °F (37.8 °C) -- -- -- --   07/25/19 1900 -- (!) 102 16 100/51 --

## 2019-07-26 NOTE — PROGRESS NOTES
Hospitalist Progress Note    Patient: Jordan Rogers MRN: [id number]  CSN: [id number]    YOB: 1995  Age: 25 y.o. Sex: female    DOA: 7/25/2019 LOS:  LOS: 1 day            Assessment/Plan     1. Sepsis w GPC cluster bacteremia 2/2 abscess from skin popping  2. Scabies  3. Cocaine abuse    Plan:  - contineu IV abx  - TTE  - repeat culture tomorrow        Patient Active Problem List   Diagnosis Code    Cellulitis L03.90    Sepsis ([address] A41.9               Subjective:    cc:  \" im fine\"  No acute events overnight        REVIEW OF SYSTEMS:  General: No fevers or chills. Cardiovascular: No chest pain or pressure. No palpitations. Pulmonary: No shortness of breath. Gastrointestinal: No nausea, vomiting. Objective:        Vital signs/Intake and Output:  Visit Vitals  /40   Pulse 92   Temp 98 °F (36.7 °C)   Resp 18   Ht 5' 5\" (1.651 m)   Wt 68.6 kg (151 lb 3.8 oz)   SpO2 100%   Breastfeeding? No   BMI 25.17 kg/m²     Current Shift:  No intake/output data recorded. Last three shifts:  07/24 1901 - 07/26 0700  In: 2890 [P.O.:240; I.V.:2650]  Out: 500 [Urine:500]    Body mass index is 25.17 kg/m².     Physical Exam:  GEN: Alert and oriented times three NAD  EYES: conjunctiva normal, lids with out lesions  HEENT: MMM, No thyromegaly, no lymphadenopathy  HEART: RRR +S1 +S2, no JVD, pulses 2+ distally  LUNGS: CTA B/L no wheezes, rales or rhonchi  ABDOMEN: + BS, soft NT/ND no organomegaly,  No rebound  EXTREMITIES: No edema cyanosis, cap refill normal   SKIN: no rashes or skin breakdown, no nodules, normal turgor, abscess cdi, multiple areas of skin opening extremities and face  Current Facility-Administered Medications   Medication Dose Route Frequency    oxyCODONE-acetaminophen (PERCOCET) 5-325 mg per tablet 1 Tab  1 Tab Oral Q6H PRN    nicotine (NICODERM CQ) 7 mg/24 hr patch 1 Patch  1 Patch TransDERmal DAILY    pantoprazole (PROTONIX) 40 mg in sodium chloride 0.9% 10 mL injection  40 mg IntraVENous DAILY    sodium chloride (NS) flush 5-10 mL  5-10 mL IntraVENous PRN    vancomycin (VANCOCIN) 1,000 mg in 0.9% sodium chloride 250 mL IVPB  1,000 mg IntraVENous Q12H    cefTRIAXone (ROCEPHIN) 1 g in sterile water (preservative free) 10 mL IV syringe  1 g IntraVENous Q24H         All the patient's labs over the past 24 hours were reviewed both during my initial daily workflow process and at the time notated as \"note time\" in [address] [facility]. (It is not time stamped separately in this workflow.)  Select labs are listed below.         Labs: Results:       Chemistry Recent Labs     07/25/19  1807   GLU 86   *   K 3.2*   CL 92*   CO2 29   BUN 11   CREA 0.86   CA 8.1*   AGAP 7   BUCR 13   *   TP 6.8   ALB 3.0*   GLOB 3.8   AGRAT 0.8      CBC w/Diff Recent Labs     07/25/19  1807   WBC 9.3   RBC 4.19*   HGB 11.8*   HCT 34.8*      GRANS 90*   LYMPH 6*   EOS 0      Cardiac Enzymes Recent Labs     07/26/19  0505   *   CKND1 0.6      Coagulation Recent Labs     07/25/19  1807   PTP 14.7   INR 1.2   APTT 29.3               Liver Enzymes Recent Labs     07/25/19  1807   TP 6.8   ALB 3.0*   *   SGOT 47*          Procedures/imaging: see electronic medical records for all procedures/Xrays and details which were not copied into this note but were reviewed prior to creation of Nita 87, DO  Internal Medicine/Geriatrics

## 2019-07-26 NOTE — PROGRESS NOTES
SBAR report received from Bill Walker . Assumed care. 0800 hrs Assessment completed. Pt continues on contact Isolations. NAD    0825 hrs Notified Dr Nicholas Aus positive blood culture Aerobic and anaerobic. Positive cluster & cocci. 1200 hrs Reassessed pt without change. 1600 hrs Reassessed pt .talking on the phone    1750 hrs PT showered . New dressing applied to R arm wound.  NAD     1930 hrs SBAR report given to [employer]

## 2019-07-27 ENCOUNTER — APPOINTMENT (OUTPATIENT)
Dept: GENERAL RADIOLOGY | Age: 24
DRG: 720 | End: 2019-07-27
Attending: HOSPITALIST
Payer: MEDICAID

## 2019-07-27 PROBLEM — R78.81 POSITIVE BLOOD CULTURE: Status: ACTIVE | Noted: 2019-07-27

## 2019-07-27 PROBLEM — M25.471 RIGHT ANKLE SWELLING: Status: ACTIVE | Noted: 2019-07-27

## 2019-07-27 LAB
ALBUMIN SERPL-MCNC: 2.2 G/DL (ref 3.4–5)
ALBUMIN/GLOB SERPL: 0.7 {RATIO} (ref 0.8–1.7)
ALP SERPL-CCNC: 143 U/L (ref 45–117)
ALT SERPL-CCNC: 29 U/L (ref 13–56)
ANION GAP SERPL CALC-SCNC: 9 MMOL/L (ref 3–18)
AST SERPL-CCNC: 38 U/L (ref 10–38)
BILIRUB SERPL-MCNC: 0.3 MG/DL (ref 0.2–1)
BUN SERPL-MCNC: 6 MG/DL (ref 7–18)
BUN/CREAT SERPL: 10 (ref 12–20)
CALCIUM SERPL-MCNC: 7.7 MG/DL (ref 8.5–10.1)
CHLORIDE SERPL-SCNC: 105 MMOL/L (ref 100–111)
CO2 SERPL-SCNC: 26 MMOL/L (ref 21–32)
CREAT SERPL-MCNC: 0.59 MG/DL (ref 0.6–1.3)
DATE LAST DOSE: NORMAL
ERYTHROCYTE [DISTWIDTH] IN BLOOD BY AUTOMATED COUNT: 13.3 % (ref 11.6–14.5)
GLOBULIN SER CALC-MCNC: 3 G/DL (ref 2–4)
GLUCOSE SERPL-MCNC: 93 MG/DL (ref 74–99)
HCT VFR BLD AUTO: 28.8 % (ref 35–45)
HGB BLD-MCNC: 9.6 G/DL (ref 12–16)
MCH RBC QN AUTO: 28 PG (ref 24–34)
MCHC RBC AUTO-ENTMCNC: 33.3 G/DL (ref 31–37)
MCV RBC AUTO: 84 FL (ref 74–97)
PLATELET # BLD AUTO: 164 K/UL (ref 135–420)
PMV BLD AUTO: 10.2 FL (ref 9.2–11.8)
POTASSIUM SERPL-SCNC: 3.6 MMOL/L (ref 3.5–5.5)
PROT SERPL-MCNC: 5.2 G/DL (ref 6.4–8.2)
RBC # BLD AUTO: 3.43 M/UL (ref 4.2–5.3)
REPORTED DOSE,DOSE: NORMAL UNITS
REPORTED DOSE/TIME,TMG: NORMAL
SODIUM SERPL-SCNC: 140 MMOL/L (ref 136–145)
VANCOMYCIN TROUGH SERPL-MCNC: 11.3 UG/ML (ref 10–20)
WBC # BLD AUTO: 7.3 K/UL (ref 4.6–13.2)

## 2019-07-27 PROCEDURE — 74011250636 HC RX REV CODE- 250/636: Performed by: EMERGENCY MEDICINE

## 2019-07-27 PROCEDURE — 80053 COMPREHEN METABOLIC PANEL: CPT

## 2019-07-27 PROCEDURE — 74011250636 HC RX REV CODE- 250/636: Performed by: FAMILY MEDICINE

## 2019-07-27 PROCEDURE — 36415 COLL VENOUS BLD VENIPUNCTURE: CPT

## 2019-07-27 PROCEDURE — 85027 COMPLETE CBC AUTOMATED: CPT

## 2019-07-27 PROCEDURE — 87040 BLOOD CULTURE FOR BACTERIA: CPT

## 2019-07-27 PROCEDURE — 74011000250 HC RX REV CODE- 250: Performed by: HOSPITALIST

## 2019-07-27 PROCEDURE — 74011000250 HC RX REV CODE- 250: Performed by: FAMILY MEDICINE

## 2019-07-27 PROCEDURE — 80202 ASSAY OF VANCOMYCIN: CPT

## 2019-07-27 PROCEDURE — 77030011256 HC DRSG MEPILEX <16IN NO BORD MOLN -A

## 2019-07-27 PROCEDURE — 73610 X-RAY EXAM OF ANKLE: CPT

## 2019-07-27 PROCEDURE — 74011250637 HC RX REV CODE- 250/637: Performed by: FAMILY MEDICINE

## 2019-07-27 PROCEDURE — C9113 INJ PANTOPRAZOLE SODIUM, VIA: HCPCS | Performed by: FAMILY MEDICINE

## 2019-07-27 PROCEDURE — 65660000000 HC RM CCU STEPDOWN

## 2019-07-27 RX ORDER — LIDOCAINE 4 G/100G
1 PATCH TOPICAL EVERY 24 HOURS
Status: DISCONTINUED | OUTPATIENT
Start: 2019-07-27 | End: 2019-07-29 | Stop reason: HOSPADM

## 2019-07-27 RX ORDER — KETOROLAC TROMETHAMINE 15 MG/ML
15 INJECTION, SOLUTION INTRAMUSCULAR; INTRAVENOUS
Status: DISCONTINUED | OUTPATIENT
Start: 2019-07-27 | End: 2019-07-29 | Stop reason: HOSPADM

## 2019-07-27 RX ADMIN — OXYCODONE HYDROCHLORIDE AND ACETAMINOPHEN 1 TABLET: 5; 325 TABLET ORAL at 11:24

## 2019-07-27 RX ADMIN — PANTOPRAZOLE SODIUM 40 MG: 40 INJECTION, POWDER, LYOPHILIZED, FOR SOLUTION INTRAVENOUS at 08:50

## 2019-07-27 RX ADMIN — CEFTRIAXONE 1 G: 1 INJECTION, POWDER, FOR SOLUTION INTRAMUSCULAR; INTRAVENOUS at 19:52

## 2019-07-27 RX ADMIN — VANCOMYCIN HYDROCHLORIDE 1000 MG: 1 INJECTION, POWDER, LYOPHILIZED, FOR SOLUTION INTRAVENOUS at 12:46

## 2019-07-27 NOTE — ROUTINE PROCESS
Bedside and Verbal shift change report given to [address]  (oncoming nurse) by Yaritza Romero RN  (offgoing nurse). Report given with SBAR, Kardex, Intake/Output and Recent Results.

## 2019-07-27 NOTE — PROGRESS NOTES
7047:  Assumed care for patient, [facility] Patient in bed on telephone with complaints of pain to right forearm, 8/10, denied need for medication, states it is not effective. Will notify provider. Whiteboard updated, bed at the lowest position with call bell within reach. 0900:  Spoke with Dr. Dave Ireland in regard to patient complaints of pain regimen not effective, no further orders given. 1124:  Patient has complaints of right ankle pain 6/10. Patient states that she \"does not know how the ankle became that way, however, it happened when arrived to hospital.\"  Upon assessment, right ankle has swelling and redness to ankle. Patient has pain when touched. Will notify provider. 1135: Informed Dr. Dave Ireland of patients complaints of ankle pain, recommended xray. Told will look into it. 1145:  Xray being performed at bedside. 1322:  Patient has complaints of nausea and lightheadedness. Upon assessment patient denied any other symptoms, stated she was hot. AC put on, room was warm, vitals taken, and patient no longer have complaints of nausea and lightheadedness. Will notify provider. Bedside and Verbal shift change report given to Yandel Martinez RN (oncoming nurse) by Brittney Koenig RN   (offgoing nurse). Report included the following information SBAR.

## 2019-07-27 NOTE — PROGRESS NOTES
Vancomycin trough = 11.5 @ 0546 07/27/2019   resultant level is with in therapeutic range therefore no changes presently.  Pharmacy shall continue to monitor and adjust based on clinical status and outcomes

## 2019-07-27 NOTE — PROGRESS NOTES
Hospitalist Progress Note-critical care note     Patient: Angela Camara MRN: [id number]  CSN: [id number]    YOB: 1995  Age: 25 y.o. Sex: female    DOA: 7/25/2019 LOS:  LOS: 2 days            Chief complaint: bacteremia , ankle swelling ,cellulitis , sepsis     Assessment/Plan         Hospital Problems  Date Reviewed: 7/26/2019          Codes Class Noted POA    Right ankle swelling ICD-10-CM: M25.471  ICD-9-CM: 719.07  7/27/2019 Unknown        Positive blood culture ICD-10-CM: R78.81  ICD-9-CM: 790.7  7/27/2019 Unknown        * (Principal) Cellulitis ICD-10-CM: L03.90  ICD-9-CM: 682.9  7/25/2019 Yes        Sepsis ([address] ICD-10-CM: A41.9  ICD-9-CM: 038.9, 995.91  7/25/2019 Yes              Sepsis w GPC cluster bacteremia 2/2 abscess   Continue rocephin and vanc  Will have echo r/o endocarditis   Repeat cx   Local wound care  Repeat bcx      Scabies  Treated, contact isolation       Cocaine abuse   watch withdrawal     Rt ankle pain-poa   Will have x-ray r/o fracture     Already on percocet, add torador for pain-no escalation pain meds     Subjective: arm is better  rn : pain  In rt ankle      Disposition :tbd,   Review of systems:    General: No fevers or chills. Cardiovascular: No chest pain or pressure. No palpitations. Pulmonary: No shortness of breath. Gastrointestinal: No nausea, vomiting. Msk: pain in arm     Vital signs/Intake and Output:  Visit Vitals  /83 (BP 1 Location: Right arm, BP Patient Position: Head of bed elevated (Comment degrees))   Pulse (!) 104   Temp 98.3 °F (36.8 °C)   Resp 16   Ht 5' 5\" (1.651 m)   Wt 68.6 kg (151 lb 3.8 oz)   SpO2 100%   Breastfeeding? No   BMI 25.17 kg/m²     Current Shift:  No intake/output data recorded. Last three shifts:  07/25 1901 - 07/27 0700  In: 1010 [P.O.:360; I.V.:650]  Out: 500 [Urine:500]    Physical Exam:  General: WD, WN. Alert, cooperative, no acute distress    HEENT: NC, Atraumatic. PERRLA, anicteric sclerae.   Lungs: CTA Bilaterally. No Wheezing/Rhonchi/Rales. Heart:  Regular  rhythm,  No murmur, No Rubs, No Gallops  Abdomen: Soft, Non distended, Non tender.  +Bowel sounds,   Extremities: No c/c. Rt forearm covered with gauze, erythema noted on rt fore arm . Mild ankle swelling -right   Psych:   Not anxious or agitated. Neurologic:  No acute neurological deficit. Labs: Results:       Chemistry Recent Labs     07/27/19  0546 07/26/19  0505 07/25/19  1807   GLU 93 109* 86    138 128*   K 3.6 3.3* 3.2*    107 92*   CO2 26 24 29   BUN 6* 10 11   CREA 0.59* 0.73 0.86   CA 7.7* 7.1* 8.1*   AGAP 9 7 7   BUCR 10* 14 13   *  --  145*   TP 5.2*  --  6.8   ALB 2.2*  --  3.0*   GLOB 3.0  --  3.8   AGRAT 0.7*  --  0.8      CBC w/Diff Recent Labs     07/27/19  0546 07/25/19  1807   WBC 7.3 9.3   RBC 3.43* 4.19*   HGB 9.6* 11.8*   HCT 28.8* 34.8*    178   GRANS  --  90*   LYMPH  --  6*   EOS  --  0      Cardiac Enzymes Recent Labs     07/26/19  0505   *   CKND1 0.6      Coagulation Recent Labs     07/25/19  1807   PTP 14.7   INR 1.2   APTT 29.3       Lipid Panel No results found for: CHOL, CHOLPOCT, CHOLX, CHLST, CHOLV, 637270, HDL, LDL, LDLC, DLDLP, 979919, VLDLC, VLDL, TGLX, TRIGL, TRIGP, TGLPOCT, CHHD, CHHDX   BNP No results for input(s): BNPP in the last 72 hours.    Liver Enzymes Recent Labs     07/27/19  0546   TP 5.2*   ALB 2.2*   *   SGOT 38      Thyroid Studies No results found for: T4, T3U, TSH, TSHEXT, TSHEXT     Procedures/imaging: see electronic medical records for all procedures/Xrays and details which were not copied into this note but were reviewed prior to creation of Walter Loredo MD

## 2019-07-27 NOTE — PROGRESS NOTES
Problem: Risk for Spread of Infection  Goal: Prevent transmission of infectious organism to others  Description  Prevent the transmission of infectious organisms to other patients, staff members, and visitors. Outcome: Progressing Towards Goal     Problem: Patient Education:  Go to Education Activity  Goal: Patient/Family Education  Outcome: Progressing Towards Goal     Problem: Cellulitis Care Plan (Adult)  Goal: *Control of acute pain  Outcome: Progressing Towards Goal  Goal: *Skin integrity maintained  Outcome: Progressing Towards Goal  Goal: *Absence of infection signs and symptoms  Outcome: Progressing Towards Goal     Problem: Patient Education: Go to Patient Education Activity  Goal: Patient/Family Education  Outcome: Progressing Towards Goal     Problem: Falls - Risk of  Goal: *Absence of Falls  Description  Document Esaw [place] Fall Risk and appropriate interventions in the flowsheet.   Outcome: Progressing Towards Goal  Note:   Fall Risk Interventions:            Medication Interventions: Teach patient to arise slowly                   Problem: Patient Education: Go to Patient Education Activity  Goal: Patient/Family Education  Outcome: Progressing Towards Goal
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
## 2019-07-28 ENCOUNTER — APPOINTMENT (OUTPATIENT)
Dept: NON INVASIVE DIAGNOSTICS | Age: 24
DRG: 720 | End: 2019-07-28
Attending: INTERNAL MEDICINE
Payer: MEDICAID

## 2019-07-28 PROBLEM — N76.0 VAGINITIS AND VULVOVAGINITIS: Status: ACTIVE | Noted: 2019-07-28

## 2019-07-28 LAB
ALBUMIN SERPL-MCNC: 2.2 G/DL (ref 3.4–5)
ALBUMIN/GLOB SERPL: 0.7 {RATIO} (ref 0.8–1.7)
ALP SERPL-CCNC: 167 U/L (ref 45–117)
ALT SERPL-CCNC: 25 U/L (ref 13–56)
ANION GAP SERPL CALC-SCNC: 9 MMOL/L (ref 3–18)
AST SERPL-CCNC: 22 U/L (ref 10–38)
BACTERIA SPEC CULT: ABNORMAL
BACTERIA SPEC CULT: ABNORMAL
BILIRUB SERPL-MCNC: 0.4 MG/DL (ref 0.2–1)
BUN SERPL-MCNC: 5 MG/DL (ref 7–18)
BUN/CREAT SERPL: 9 (ref 12–20)
CALCIUM SERPL-MCNC: 7.7 MG/DL (ref 8.5–10.1)
CHLORIDE SERPL-SCNC: 107 MMOL/L (ref 100–111)
CO2 SERPL-SCNC: 26 MMOL/L (ref 21–32)
CREAT SERPL-MCNC: 0.53 MG/DL (ref 0.6–1.3)
ECHO AO ARCH DIAM: 1.9 CM
ECHO AO ASC DIAM: 2.28 CM
ECHO AO ROOT DIAM: 2.75 CM
ECHO AV AREA PEAK VELOCITY: 2.2 CM2
ECHO AV AREA VTI: 2.4 CM2
ECHO AV AREA/BSA PEAK VELOCITY: 1.2 CM2/M2
ECHO AV AREA/BSA VTI: 1.3 CM2/M2
ECHO AV MEAN GRADIENT: 3.9 MMHG
ECHO AV PEAK GRADIENT: 6.9 MMHG
ECHO AV PEAK VELOCITY: 131.23 CM/S
ECHO AV VTI: 26.25 CM
ECHO IVC PROX: 1.6 CM
ECHO LA MAJOR AXIS: 3.61 CM
ECHO LA VOL 2C: 48.96 ML (ref 22–52)
ECHO LA VOL 4C: 39.92 ML (ref 22–52)
ECHO LA VOL BP: 51.82 ML (ref 22–52)
ECHO LA VOL/BSA BIPLANE: 29.52 ML/M2 (ref 16–28)
ECHO LA VOLUME INDEX A2C: 27.89 ML/M2 (ref 16–28)
ECHO LA VOLUME INDEX A4C: 22.74 ML/M2 (ref 16–28)
ECHO LV E' LATERAL VELOCITY: 23 CM/S
ECHO LV E' SEPTAL VELOCITY: 14 CM/S
ECHO LV EDV A2C: 72.1 ML
ECHO LV EDV A4C: 79 ML
ECHO LV EDV BP: 75.3 ML (ref 56–104)
ECHO LV EDV INDEX A4C: 45 ML/M2
ECHO LV EDV INDEX BP: 42.9 ML/M2
ECHO LV EDV NDEX A2C: 41.1 ML/M2
ECHO LV EJECTION FRACTION A2C: 46 %
ECHO LV EJECTION FRACTION A4C: 48 %
ECHO LV EJECTION FRACTION BIPLANE: 45.8 % (ref 55–100)
ECHO LV ESV A2C: 39.3 ML
ECHO LV ESV A4C: 41.3 ML
ECHO LV ESV BP: 40.8 ML (ref 19–49)
ECHO LV ESV INDEX A2C: 22.4 ML/M2
ECHO LV ESV INDEX A4C: 23.5 ML/M2
ECHO LV ESV INDEX BP: 23.2 ML/M2
ECHO LV INTERNAL DIMENSION DIASTOLIC: 4.85 CM (ref 3.9–5.3)
ECHO LV INTERNAL DIMENSION SYSTOLIC: 3.18 CM
ECHO LV IVSD: 0.79 CM (ref 0.6–0.9)
ECHO LV MASS 2D: 137.9 G (ref 67–162)
ECHO LV MASS INDEX 2D: 78.6 G/M2 (ref 43–95)
ECHO LV POSTERIOR WALL DIASTOLIC: 0.74 CM (ref 0.6–0.9)
ECHO LVOT DIAM: 1.99 CM
ECHO LVOT PEAK GRADIENT: 3.4 MMHG
ECHO LVOT PEAK VELOCITY: 92.65 CM/S
ECHO LVOT VTI: 19.97 CM
ECHO MV A VELOCITY: 42.16 CM/S
ECHO MV AREA PHT: 5.3 CM2
ECHO MV E DECELERATION TIME (DT): 143.5 MS
ECHO MV E VELOCITY: 119.81 CM/S
ECHO MV E/A RATIO: 2.84
ECHO MV E/E' LATERAL: 5.21
ECHO MV E/E' RATIO (AVERAGED): 6.88
ECHO MV E/E' SEPTAL: 8.56
ECHO MV PRESSURE HALF TIME (PHT): 41.6 MS
ECHO PULMONARY ARTERY SYSTOLIC PRESSURE (PASP): 36 MMHG
ECHO PV MAX VELOCITY: 106.58 CM/S
ECHO PV PEAK GRADIENT: 4.5 MMHG
ECHO PVEIN A VELOCITY: 21.68 CM/S
ECHO PVEIN PEAK D VELOCITY: 73.42 CM/S
ECHO PVEIN S/D RATIO: 0.9
ECHO RA AREA 4C: 13.2 CM2
ECHO RV INTERNAL DIMENSION: 3.46 CM
ECHO TRICUSPID ANNULAR PEAK SYSTOLIC VELOCITY: 3 CM/S
ECHO TV REGURGITANT MAX VELOCITY: 278.4 CM/S
ECHO TV REGURGITANT PEAK GRADIENT: 31 MMHG
ERYTHROCYTE [DISTWIDTH] IN BLOOD BY AUTOMATED COUNT: 13.5 % (ref 11.6–14.5)
GLOBULIN SER CALC-MCNC: 3.3 G/DL (ref 2–4)
GLUCOSE SERPL-MCNC: 92 MG/DL (ref 74–99)
GRAM STN SPEC: ABNORMAL
HCT VFR BLD AUTO: 28.3 % (ref 35–45)
HGB BLD-MCNC: 9.4 G/DL (ref 12–16)
MCH RBC QN AUTO: 28 PG (ref 24–34)
MCHC RBC AUTO-ENTMCNC: 33.2 G/DL (ref 31–37)
MCV RBC AUTO: 84.2 FL (ref 74–97)
PLATELET # BLD AUTO: 188 K/UL (ref 135–420)
PMV BLD AUTO: 10.4 FL (ref 9.2–11.8)
POTASSIUM SERPL-SCNC: 3.3 MMOL/L (ref 3.5–5.5)
PROT SERPL-MCNC: 5.5 G/DL (ref 6.4–8.2)
RBC # BLD AUTO: 3.36 M/UL (ref 4.2–5.3)
SERVICE CMNT-IMP: ABNORMAL
SERVICE CMNT-IMP: ABNORMAL
SODIUM SERPL-SCNC: 142 MMOL/L (ref 136–145)
WBC # BLD AUTO: 7.4 K/UL (ref 4.6–13.2)

## 2019-07-28 PROCEDURE — 74011250637 HC RX REV CODE- 250/637: Performed by: FAMILY MEDICINE

## 2019-07-28 PROCEDURE — 65660000000 HC RM CCU STEPDOWN

## 2019-07-28 PROCEDURE — 93306 TTE W/DOPPLER COMPLETE: CPT

## 2019-07-28 PROCEDURE — 74011250636 HC RX REV CODE- 250/636: Performed by: EMERGENCY MEDICINE

## 2019-07-28 PROCEDURE — C9113 INJ PANTOPRAZOLE SODIUM, VIA: HCPCS | Performed by: FAMILY MEDICINE

## 2019-07-28 PROCEDURE — 74011250636 HC RX REV CODE- 250/636: Performed by: HOSPITALIST

## 2019-07-28 PROCEDURE — 74011000250 HC RX REV CODE- 250: Performed by: FAMILY MEDICINE

## 2019-07-28 PROCEDURE — 74011250637 HC RX REV CODE- 250/637: Performed by: HOSPITALIST

## 2019-07-28 PROCEDURE — 80053 COMPREHEN METABOLIC PANEL: CPT

## 2019-07-28 PROCEDURE — 36415 COLL VENOUS BLD VENIPUNCTURE: CPT

## 2019-07-28 PROCEDURE — 85027 COMPLETE CBC AUTOMATED: CPT

## 2019-07-28 PROCEDURE — 74011250636 HC RX REV CODE- 250/636: Performed by: FAMILY MEDICINE

## 2019-07-28 PROCEDURE — 74011000250 HC RX REV CODE- 250: Performed by: HOSPITALIST

## 2019-07-28 RX ORDER — POTASSIUM CHLORIDE 20 MEQ/1
40 TABLET, EXTENDED RELEASE ORAL
Status: COMPLETED | OUTPATIENT
Start: 2019-07-28 | End: 2019-07-28

## 2019-07-28 RX ORDER — ASPIRIN 325 MG
1 TABLET, DELAYED RELEASE (ENTERIC COATED) ORAL
Status: DISCONTINUED | OUTPATIENT
Start: 2019-07-28 | End: 2019-07-29 | Stop reason: HOSPADM

## 2019-07-28 RX ADMIN — OXYCODONE HYDROCHLORIDE AND ACETAMINOPHEN 1 TABLET: 5; 325 TABLET ORAL at 17:13

## 2019-07-28 RX ADMIN — VANCOMYCIN HYDROCHLORIDE 1000 MG: 1 INJECTION, POWDER, LYOPHILIZED, FOR SOLUTION INTRAVENOUS at 15:11

## 2019-07-28 RX ADMIN — VANCOMYCIN HYDROCHLORIDE 1000 MG: 1 INJECTION, POWDER, LYOPHILIZED, FOR SOLUTION INTRAVENOUS at 01:55

## 2019-07-28 RX ADMIN — PANTOPRAZOLE SODIUM 40 MG: 40 INJECTION, POWDER, LYOPHILIZED, FOR SOLUTION INTRAVENOUS at 09:58

## 2019-07-28 RX ADMIN — KETOROLAC TROMETHAMINE 15 MG: 15 INJECTION, SOLUTION INTRAMUSCULAR; INTRAVENOUS at 18:16

## 2019-07-28 RX ADMIN — OXYCODONE HYDROCHLORIDE AND ACETAMINOPHEN 1 TABLET: 5; 325 TABLET ORAL at 23:10

## 2019-07-28 RX ADMIN — CLOTRIMAZOLE 1 APPLICATOR: 1 CREAM VAGINAL at 21:12

## 2019-07-28 RX ADMIN — POTASSIUM CHLORIDE 40 MEQ: 20 TABLET, EXTENDED RELEASE ORAL at 13:14

## 2019-07-28 RX ADMIN — CEFTRIAXONE 1 G: 1 INJECTION, POWDER, FOR SOLUTION INTRAMUSCULAR; INTRAVENOUS at 17:14

## 2019-07-28 RX ADMIN — OXYCODONE HYDROCHLORIDE AND ACETAMINOPHEN 1 TABLET: 5; 325 TABLET ORAL at 05:06

## 2019-07-28 NOTE — PROGRESS NOTES
0- Assumed patient care from off going nurse Brandt Mckeon RN. Patient sitting in bed on telephone and is in NAD. No complaints or concerns voiced at this time. Will continue to monitor. 0600- Percocet given for complaint of right arm pain. Results were effective. No further complaints or concerns voiced. 3808- Patient had an uneventful shift and remained stable. Hourly rounding completed throughout the shift. NAD noted at this time and patient is resting quietly in bed. No concerns or requests voiced. Bedside and Verbal shift change report given to Maurice MCCALL (oncoming nurse) by Sandee Goltz RN (offgoing nurse). Report included the following information SBAR, MAR and Cardiac Rhythm NSR.

## 2019-07-28 NOTE — PROGRESS NOTES
6409  Bedside and Verbal shift change report given to Lily Hawley, RN by LENCHO Gipson,RN. Report included the following information SBAR, Kardex, OR Summary, Intake/Output and MAR.     1206  Called  to inform her that the pt would like to leave AMA  1212   said she doesn't approve    [address]  to inform her that pt decided to stay, and ask for the vaginal cream   1342   ordered the cream for bedtime    [place] about 1300 vanco dose to check when it will be coming up    1528  Bedside and Verbal shift change report given to Paige,RN by Lily Hawley, RN. Report included the following information SBAR, Kardex, OR Summary, Intake/Output and MAR.

## 2019-07-28 NOTE — PROGRESS NOTES
Problem: Risk for Spread of Infection  Goal: Prevent transmission of infectious organism to others  Description  Prevent the transmission of infectious organisms to other patients, staff members, and visitors. Outcome: Progressing Towards Goal     Problem: Patient Education:  Go to Education Activity  Goal: Patient/Family Education  Outcome: Progressing Towards Goal     Problem: Cellulitis Care Plan (Adult)  Goal: *Control of acute pain  Outcome: Progressing Towards Goal  Goal: *Skin integrity maintained  Outcome: Progressing Towards Goal  Goal: *Absence of infection signs and symptoms  Outcome: Progressing Towards Goal     Problem: Patient Education: Go to Patient Education Activity  Goal: Patient/Family Education  Outcome: Progressing Towards Goal     Problem: Falls - Risk of  Goal: *Absence of Falls  Description  Document Michael Zazueta Fall Risk and appropriate interventions in the flowsheet.   Outcome: Progressing Towards Goal  Note:   Fall Risk Interventions:            Medication Interventions: Teach patient to arise slowly                   Problem: Patient Education: Go to Patient Education Activity  Goal: Patient/Family Education  Outcome: Progressing Towards Goal     Problem: Pain  Goal: *Control of Pain  Outcome: Progressing Towards Goal

## 2019-07-28 NOTE — PROGRESS NOTES
Problem: Risk for Spread of Infection  Goal: Prevent transmission of infectious organism to others  Description  Prevent the transmission of infectious organisms to other patients, staff members, and visitors. Outcome: Progressing Towards Goal     Problem: Patient Education:  Go to Education Activity  Goal: Patient/Family Education  Outcome: Progressing Towards Goal     Problem: Cellulitis Care Plan (Adult)  Goal: *Control of acute pain  Outcome: Progressing Towards Goal     Problem: Falls - Risk of  Goal: *Absence of Falls  Description  Document Esaw [place] Fall Risk and appropriate interventions in the flowsheet.   Outcome: Progressing Towards Goal  Note:   Fall Risk Interventions:            Medication Interventions: Teach patient to arise slowly

## 2019-07-28 NOTE — PROGRESS NOTES
Hospitalist Progress Note-critical care note     Patient: Jacque Mclaughlin MRN: [id number]  CSN: [id number]    YOB: 1995  Age: 25 y.o. Sex: female    DOA: 7/25/2019 LOS:  LOS: 3 days            Chief complaint: bacteremia , ankle swelling ,cellulitis , sepsis     Assessment/Plan         Hospital Problems  Date Reviewed: 7/26/2019          Codes Class Noted POA    Vaginitis and vulvovaginitis ICD-10-CM: N76.0  ICD-9-CM: 616.10  7/28/2019 Unknown        Right ankle swelling ICD-10-CM: M25.471  ICD-9-CM: 719.07  7/27/2019 Unknown        Positive blood culture ICD-10-CM: R78.81  ICD-9-CM: 790.7  7/27/2019 Unknown        * (Principal) Cellulitis ICD-10-CM: L03.90  ICD-9-CM: 682.9  7/25/2019 Yes        Sepsis ([address] ICD-10-CM: A41.9  ICD-9-CM: 038.9, 995.91  7/25/2019 Yes              Sepsis w GPC cluster bacteremia 2/2 abscess   Continue rocephin and vanc  echo  Done r/o endocarditis , possible need evelyne if tte is ok  bcx : dasha stevenson -need id on Monday   Repeat cx so far negative   Local wound care     Scabies  Treated, contact isolation       Cocaine abuse   watch withdrawal     Rt ankle pain-poa   No fracture     Vaginitis /vulvovaginitis   \"chees like \" stuff-like yeast infection, topic cream     on percocet/ torador for pain-no escalation pain meds     Subjective: itchiness of my private part   rn : want to leave, now ok to stay     Disposition :tbd,   Review of systems:    General: No fevers or chills. Cardiovascular: No chest pain or pressure. No palpitations. Pulmonary: No shortness of breath. Gastrointestinal: No nausea, vomiting. Msk: pain in arm     Vital signs/Intake and Output:  Visit Vitals  /78   Pulse 78   Temp 98 °F (36.7 °C)   Resp 14   Ht 5' 5\" (1.651 m)   Wt 68.5 kg (151 lb)   SpO2 100%   Breastfeeding? No   BMI 25.13 kg/m²     Current Shift:  No intake/output data recorded. Last three shifts:  07/26 1901 - 07/28 0700  In: 560 [P.O.:300;  I.V.:260]  Out: 0     Physical Exam:  General: WD, WN. Alert, cooperative, no acute distress    HEENT: NC, Atraumatic. PERRLA, anicteric sclerae. Lungs: CTA Bilaterally. No Wheezing/Rhonchi/Rales. Heart:  Regular  rhythm,  No murmur, No Rubs, No Gallops  Abdomen: Soft, Non distended, Non tender.  +Bowel sounds,   Extremities: No c/c. Rt forearm covered with gauze, erythema noted on rt fore arm -improving . Psych:   Not anxious or agitated. Neurologic:  No acute neurological deficit. : cheese like secretion noticed,          Labs: Results:       Chemistry Recent Labs     07/28/19  0617 07/27/19  0546 07/26/19  0505 07/25/19  1807   GLU 92 93 109* 86    140 138 128*   K 3.3* 3.6 3.3* 3.2*    105 107 92*   CO2 26 26 24 29   BUN 5* 6* 10 11   CREA 0.53* 0.59* 0.73 0.86   CA 7.7* 7.7* 7.1* 8.1*   AGAP 9 9 7 7   BUCR 9* 10* 14 13   * 143*  --  145*   TP 5.5* 5.2*  --  6.8   ALB 2.2* 2.2*  --  3.0*   GLOB 3.3 3.0  --  3.8   AGRAT 0.7* 0.7*  --  0.8      CBC w/Diff Recent Labs     07/28/19  0617 07/27/19  0546 07/25/19  1807   WBC 7.4 7.3 9.3   RBC 3.36* 3.43* 4.19*   HGB 9.4* 9.6* 11.8*   HCT 28.3* 28.8* 34.8*    164 178   GRANS  --   --  90*   LYMPH  --   --  6*   EOS  --   --  0      Cardiac Enzymes Recent Labs     07/26/19  0505   *   CKND1 0.6      Coagulation Recent Labs     07/25/19  1807   PTP 14.7   INR 1.2   APTT 29.3       Lipid Panel No results found for: CHOL, CHOLPOCT, CHOLX, CHLST, CHOLV, 480652, HDL, LDL, LDLC, DLDLP, 803015, VLDLC, VLDL, TGLX, TRIGL, TRIGP, TGLPOCT, CHHD, CHHDX   BNP No results for input(s): BNPP in the last 72 hours.    Liver Enzymes Recent Labs     07/28/19  0617   TP 5.5*   ALB 2.2*   *   SGOT 22      Thyroid Studies No results found for: T4, T3U, TSH, TSHEXT, TSHEXT     Procedures/imaging: see electronic medical records for all procedures/Xrays and details which were not copied into this note but were reviewed prior to creation of Walter Aguilar MD

## 2019-07-29 VITALS
HEART RATE: 85 BPM | SYSTOLIC BLOOD PRESSURE: 114 MMHG | BODY MASS INDEX: 25.16 KG/M2 | OXYGEN SATURATION: 100 % | TEMPERATURE: 98.1 F | HEIGHT: 65 IN | RESPIRATION RATE: 19 BRPM | WEIGHT: 151 LBS | DIASTOLIC BLOOD PRESSURE: 73 MMHG

## 2019-07-29 LAB
ALBUMIN SERPL-MCNC: 2.3 G/DL (ref 3.4–5)
ALBUMIN/GLOB SERPL: 0.7 {RATIO} (ref 0.8–1.7)
ALP SERPL-CCNC: 174 U/L (ref 45–117)
ALT SERPL-CCNC: 20 U/L (ref 13–56)
ANION GAP SERPL CALC-SCNC: 8 MMOL/L (ref 3–18)
AST SERPL-CCNC: 16 U/L (ref 10–38)
BILIRUB SERPL-MCNC: 0.3 MG/DL (ref 0.2–1)
BUN SERPL-MCNC: 6 MG/DL (ref 7–18)
BUN/CREAT SERPL: 12 (ref 12–20)
CALCIUM SERPL-MCNC: 8.1 MG/DL (ref 8.5–10.1)
CHLORIDE SERPL-SCNC: 109 MMOL/L (ref 100–111)
CO2 SERPL-SCNC: 25 MMOL/L (ref 21–32)
CREAT SERPL-MCNC: 0.49 MG/DL (ref 0.6–1.3)
ERYTHROCYTE [DISTWIDTH] IN BLOOD BY AUTOMATED COUNT: 13.8 % (ref 11.6–14.5)
GLOBULIN SER CALC-MCNC: 3.5 G/DL (ref 2–4)
GLUCOSE SERPL-MCNC: 90 MG/DL (ref 74–99)
HCT VFR BLD AUTO: 29.7 % (ref 35–45)
HGB BLD-MCNC: 9.7 G/DL (ref 12–16)
MCH RBC QN AUTO: 28 PG (ref 24–34)
MCHC RBC AUTO-ENTMCNC: 32.7 G/DL (ref 31–37)
MCV RBC AUTO: 85.6 FL (ref 74–97)
PLATELET # BLD AUTO: 248 K/UL (ref 135–420)
PMV BLD AUTO: 10.3 FL (ref 9.2–11.8)
POTASSIUM SERPL-SCNC: 3.7 MMOL/L (ref 3.5–5.5)
PROT SERPL-MCNC: 5.8 G/DL (ref 6.4–8.2)
RBC # BLD AUTO: 3.47 M/UL (ref 4.2–5.3)
SODIUM SERPL-SCNC: 142 MMOL/L (ref 136–145)
WBC # BLD AUTO: 4.8 K/UL (ref 4.6–13.2)

## 2019-07-29 PROCEDURE — 36415 COLL VENOUS BLD VENIPUNCTURE: CPT

## 2019-07-29 PROCEDURE — 74011250637 HC RX REV CODE- 250/637: Performed by: FAMILY MEDICINE

## 2019-07-29 PROCEDURE — 74011250637 HC RX REV CODE- 250/637: Performed by: HOSPITALIST

## 2019-07-29 PROCEDURE — 74011250636 HC RX REV CODE- 250/636: Performed by: HOSPITALIST

## 2019-07-29 PROCEDURE — 85027 COMPLETE CBC AUTOMATED: CPT

## 2019-07-29 PROCEDURE — 74011250636 HC RX REV CODE- 250/636: Performed by: EMERGENCY MEDICINE

## 2019-07-29 PROCEDURE — 80053 COMPREHEN METABOLIC PANEL: CPT

## 2019-07-29 RX ORDER — FLUCONAZOLE 100 MG/1
200 TABLET ORAL DAILY
Status: DISCONTINUED | OUTPATIENT
Start: 2019-07-29 | End: 2019-07-29 | Stop reason: HOSPADM

## 2019-07-29 RX ORDER — CEFAZOLIN SODIUM/WATER 2 G/20 ML
2 SYRINGE (ML) INTRAVENOUS EVERY 8 HOURS
Status: DISCONTINUED | OUTPATIENT
Start: 2019-07-29 | End: 2019-07-29 | Stop reason: HOSPADM

## 2019-07-29 RX ORDER — AMOXICILLIN AND CLAVULANATE POTASSIUM 875; 125 MG/1; MG/1
1 TABLET, FILM COATED ORAL 2 TIMES DAILY
Qty: 28 TAB | Refills: 0 | Status: SHIPPED
Start: 2019-07-29 | End: 2019-12-08

## 2019-07-29 RX ADMIN — VANCOMYCIN HYDROCHLORIDE 1000 MG: 1 INJECTION, POWDER, LYOPHILIZED, FOR SOLUTION INTRAVENOUS at 14:30

## 2019-07-29 RX ADMIN — FLUCONAZOLE 200 MG: 100 TABLET ORAL at 08:00

## 2019-07-29 RX ADMIN — KETOROLAC TROMETHAMINE 15 MG: 15 INJECTION, SOLUTION INTRAMUSCULAR; INTRAVENOUS at 02:41

## 2019-07-29 RX ADMIN — Medication 2 G: at 13:31

## 2019-07-29 RX ADMIN — KETOROLAC TROMETHAMINE 15 MG: 15 INJECTION, SOLUTION INTRAMUSCULAR; INTRAVENOUS at 11:25

## 2019-07-29 RX ADMIN — VANCOMYCIN HYDROCHLORIDE 1000 MG: 1 INJECTION, POWDER, LYOPHILIZED, FOR SOLUTION INTRAVENOUS at 00:44

## 2019-07-29 RX ADMIN — OXYCODONE HYDROCHLORIDE AND ACETAMINOPHEN 1 TABLET: 5; 325 TABLET ORAL at 05:00

## 2019-07-29 NOTE — CONSULTS
Infectious Disease Consultation Note    Date of Admission: 7/25/2019    Date of Consultation: 7/29/2019    Referred by: Dr. David Broderick       Reason for Referral: Staph aureus abscess, cellulitis, bacteremia       Current Antimicrobials: Prior Antimicrobials   Vancomycin 7/25 - 4, Cefazolin 7/29 - 0 Ceftriaxone 7/25 - 3     Assessment / Plan:     MSSA BSI  - uncomplicated MSSA bacteremia (no indwelling intravascular device, no evidence of IE on TTE, defervesced w/in 24 hours of IV abx rx  - due to skin abscess, follow up blcx neg w/in 48 h of abx rx, no evidence of metastatic infection)  - 2 of 2 blcx + 7/25  - repeat blcx 7/27 NGTD x 2  - TTE 7/28: no vegetations -> rx w/ IV abx 14 days from 1st negative blcx (7/27 - NGTD)   -> dc Vancomycin  -> would continue Cefazolin for now until clear that 7/27 blcx are negative. IV antibiotics until 8/10/2019 if 7/27 blcx remain negative  -> could switch to ceftriaxone 2 gm IV once daily at OP infusion center via peripheral line if has accessible veins  -> high risk for non-compliance with rx  -> agree with avoiding PICC   Substance abuse disorder  - injects cocaine IV, sexually active  - visitor today was caught providing heroin to patient and syringe was attached to IV port when caught. -> will check for HIV, Hep B/C, Syphilis   Subcutaneous abscess, cellulitis right forearm  - attempted to inject Cocaine IV and missed  - s/p I&D 7/25, no cx    Multiple scabs on extremities  - suspect bedbug bites >scabies but has been treated with permethrin for scabies already. Microbiology:   7/25 blcx MSSA x 2  7/27 blcx NTGD x 2    Lines / Catheters:     piv    HPI:     25year-old  female admitted to [facility] 7/25/2019 due to right forearm abscess. She had tried to inject cocaine intravenously in her right forearm but missed. She developed a swelling which became red and painful, unlike when she had missed in the passed and the swellings just resolved.   By three days PTA she became febrile and felt very ill with chills and sweats. These symptoms continued over the next days and she presented to the ED where a right forearm abscess was incised and drained. No cultures of the abscess material were done. She was admitted and started on Vancomycin and Ceftriaxone 7/25. Blood cultures from admission 7/25 both grew gram positive cocci later identififed as MSSA. Repeat blood cultures 7/27 are no growth to date and TTE showed no vegetations. On admission she also was suspected to have bed bug bites versus scabies and was treated with total body permethrin. Afebrile since admission, her WBC  Count has remained normal.      Active Hospital Problems    Diagnosis Date Noted    Vaginitis and vulvovaginitis 07/28/2019    Right ankle swelling 07/27/2019    Positive blood culture 07/27/2019    Cellulitis 07/25/2019    Sepsis ([address] 07/25/2019     History reviewed. No pertinent past medical history. History reviewed. No pertinent surgical history. History reviewed. No pertinent family history. Social History     Socioeconomic History    Marital status: SINGLE     Spouse name: Not on file    Number of children: Not on file    Years of education: Not on file    Highest education level: Not on file   Occupational History    Not on file   Social Needs    Financial resource strain: Not on file    Food insecurity:     Worry: Not on file     Inability: Not on file    Transportation needs:     Medical: Not on file     Non-medical: Not on file   Tobacco Use    Smoking status: Current Every Day Smoker    Smokeless tobacco: Current User   Substance and Sexual Activity    Alcohol use:  Yes    Drug use: Not on file    Sexual activity: Not on file   Lifestyle    Physical activity:     Days per week: Not on file     Minutes per session: Not on file    Stress: Not on file   Relationships    Social connections:     Talks on phone: Not on file     Gets together: Not on file     Attends Mandaeism service: Not on file     Active member of club or organization: Not on file     Attends meetings of clubs or organizations: Not on file     Relationship status: Not on file    Intimate partner violence:     Fear of current or ex partner: Not on file     Emotionally abused: Not on file     Physically abused: Not on file     Forced sexual activity: Not on file   Other Topics Concern    Not on file   Social History Narrative    Not on file       Allergies:    Patient has no known allergies. .    Medications:     Current Facility-Administered Medications   Medication Dose Route Frequency    ceFAZolin (ANCEF) 2 g/20 mL in sterile water IV syringe  2 g IntraVENous Q8H    fluconazole (DIFLUCAN) tablet 200 mg  200 mg Oral DAILY    clotrimazole (MYCELEX) 1 % cream 1 Applicator  1 Applicator Vaginal QHS    ketorolac (TORADOL) injection 15 mg  15 mg IntraVENous Q8H PRN    lidocaine 4 % patch 1 Patch  1 Patch TransDERmal Q24H    oxyCODONE-acetaminophen (PERCOCET) 5-325 mg per tablet 1 Tab  1 Tab Oral Q6H PRN    nicotine (NICODERM CQ) 7 mg/24 hr patch 1 Patch  1 Patch TransDERmal DAILY    Vancomycin - Pharmacy to Dose  1 Each Other Rx Dosing/Monitoring    sodium chloride (NS) flush 5-10 mL  5-10 mL IntraVENous PRN    vancomycin (VANCOCIN) 1,000 mg in 0.9% sodium chloride 250 mL IVPB  1,000 mg IntraVENous Q12H       ROS:   A comprehensive review of systems was negative except for that written in the History of Present Illness. Physical Exam:     Temp (24hrs), Av.2 °F (36.8 °C), Min:97.7 °F (36.5 °C), Max:98.5 °F (36.9 °C)    Visit Vitals  /73 (BP 1 Location: Left arm, BP Patient Position: At rest;Head of bed elevated (Comment degrees))   Pulse 85   Temp 98.1 °F (36.7 °C)   Resp 19   Ht 5' 5\" (1.651 m)   Wt 68.5 kg (151 lb)   LMP 2019   SpO2 100%   Breastfeeding? No   BMI 25.13 kg/m²       General: Well developed, well nourished 25 y.o.  female in no acute distress.   ENT: ENT exam normal, no neck nodes or sinus tenderness  Head: normocephalic, without obvious abnormality  Mouth:  mucous membranes moist, pharynx normal without lesions  Neck: supple, symmetrical, trachea midline   Cardio:  regular rate and rhythm, S1, S2 normal, no murmur, click, rub or gallop  Chest: inspection normal - no chest wall deformities or tenderness, respiratory effort normal  Lungs: clear to auscultation, no wheezes or rales and unlabored breathing  Abdomen: soft, non-tender. Bowel sounds normal. No masses, no organomegaly.   Extremities:  no redness or tenderness in the calves or thighs, no edema, multiple brown scabs, ulcers likely bed bug bites  Neuro: Grossly normal    Lab results:     Chemistry  Recent Labs     07/29/19  0515 07/28/19  0617 07/27/19  0546   GLU 90 92 93    142 140   K 3.7 3.3* 3.6    107 105   CO2 25 26 26   BUN 6* 5* 6*   CREA 0.49* 0.53* 0.59*   CA 8.1* 7.7* 7.7*   AGAP 8 9 9   BUCR 12 9* 10*   * 167* 143*   TP 5.8* 5.5* 5.2*   ALB 2.3* 2.2* 2.2*   GLOB 3.5 3.3 3.0   AGRAT 0.7* 0.7* 0.7*       CBC w/ Diff  Recent Labs     07/29/19  0515 07/28/19  0617 07/27/19  0546   WBC 4.8 7.4 7.3   RBC 3.47* 3.36* 3.43*   HGB 9.7* 9.4* 9.6*   HCT 29.7* 28.3* 28.8*    188 164       Microbiology  All Micro Results     Procedure Component Value Units Date/Time    CULTURE, BLOOD [[id number]] Collected:  07/27/19 1318    Order Status:  Completed Specimen:  Blood Updated:  07/29/19 0750     Special Requests: NO SPECIAL REQUESTS        Culture result: NO GROWTH 2 DAYS       CULTURE, BLOOD [[id number]] Collected:  07/27/19 1328    Order Status:  Completed Specimen:  Blood Updated:  07/29/19 0750     Special Requests: NO SPECIAL REQUESTS        Culture result: NO GROWTH 2 DAYS       CULTURE, BLOOD [[id number]]  (Abnormal) Collected:  07/25/19 1840    Order Status:  Completed Specimen:  Blood Updated:  07/28/19 0712     Special Requests: NO SPECIAL REQUESTS        GRAM STAIN       AEROBIC BOTTLE GRAM POSITIVE COCCI IN CLUSTERS                  ANAEROBIC BOTTLE GRAM POSITIVE COCCI IN CLUSTERS GRAM POSITIVE COCCI IN PAIRS                  SMEAR CALLED TO AND CORRECTLY REPEATED BY: Татьяна Saleh RN, 3N ON 07/26/2019 AT 0738 TO JDA           Culture result:       AEROBIC AND ANAEROBIC BOTTLES STAPHYLOCOCCUS AUREUS For Susceptibility Refer to Culture [id number]          CULTURE, BLOOD [[id number]]  (Abnormal)  (Susceptibility) Collected:  07/25/19 1807    Order Status:  Completed Specimen:  Blood Updated:  07/28/19 0711     Special Requests: NO SPECIAL REQUESTS        GRAM STAIN       AEROBIC BOTTLE GRAM POSITIVE COCCI IN CLUSTERS                  ANAEROBIC BOTTLE GRAM POSITIVE COCCI IN CLUSTERS GRAM POSITIVE COCCI IN PAIRS                  SMEAR CALLED TO AND CORRECTLY REPEATED BY: Татьяна Saleh RN, 3N ON 07/26/2019 AT 0738 TO JDA           Culture result:       AEROBIC AND ANAEROBIC BOTTLES STAPHYLOCOCCUS AUREUS                   Shlomo Castellanos MD, [facility]  Infectious Disease Specialist  Pager [phone]

## 2019-07-29 NOTE — PROGRESS NOTES
Hospitalist Progress Note    Patient: Kristi Herrmann MRN: [id number]  CSN: [id number]    YOB: 1995  Age: 25 y.o. Sex: female    DOA: 7/25/2019 LOS:  LOS: 4 days          This young woman who is a drug user has decided she will leave AMA. She had a friend come earlier who was caught by the nurse trying to push a syringe of ilicit substance thru her IV. I am not sure how much went through, but there has been no noted side effect from nurse. Earlier today when I met with her, I told her if she did not complete the recommended IV antibiotic course for her infection she was at risk for endocarditis, further organ infection, sepsis, and death. She remained very focussed on when she could leave. ID came by and made recs for cointinued IV antibiotics until 8/10 if last set of blood cx remained negative. Her mother called case management and asked if she could discharge with a picc-this is NOT recommended with her drug use and this was also noted to her and her mother BEFORE a friend stopped by with drugs for her IV here. This young woman can make decision to leave AMA. The risks have been discussed with her. I will provide PO antibiotcs in [address] form, but they will NOT suffice for her bacterial blood infection. I hope she will rethink this life threatening decision.

## 2019-07-29 NOTE — PROGRESS NOTES
Occupational Therapy Screening:  Services are not indicated at this time. [facility] for [facility] therapy based on results obtained during the nursing admission assessment. The patients chart was reviewed and the patient is not appropriate for a skilled therapy evaluation at this time. Patient was admitted with RUE abscess & IV drug abuse. RUE edema should respond to ABX. If pt becomes a surgical candidate to remove or drain abscess, Please consult occupational therapy for ROM and adaptive strategies. Thank you.   Marguerite Nava, OTR/L

## 2019-07-29 NOTE — PROGRESS NOTES
Shift Summary: Patient required Toradol 15 mg IV alternating with Percocet 1 tablet po for pain to right ankle rating at 8/10, which provided relief.       Patient Vitals for the past 8 hrs:   Temp Pulse Resp BP SpO2   07/29/19 0247 98.4 °F (36.9 °C) 65 17 107/60 98 %   07/28/19 2311 97.9 °F (36.6 °C) 80 17 124/68 100 %

## 2019-07-29 NOTE — ROUTINE PROCESS
Bedside and Verbal shift change report given to STEFANY Nino (oncoming nurse) by Allison Kamara (offgoing nurse). Report included the following information SBAR, Kardex, Intake/Output and MAR.

## 2019-07-29 NOTE — PROGRESS NOTES
0730: Bedside and Verbal shift change report given to Vy Pope RN (oncoming nurse) by Es Mann (offgoing nurse). Report included the following information Kardex, Intake/Output, Cardiac Rhythm NSR and Alarm Parameters . 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 for antbx tx and discharged home, make mother aware at this time pt can not be discharged home, mother makes this nurse aware she will speak to the case management   Vy Pope RN    1330: pt mother calls and request MD to call her in reference to pt being discharged home and request information in reference to pt getting a PICC line and sent home for antbx therapy, make mother aware at this time, pt can not be sent home on antbx therapy, and MD will be contacted accordingly  Vy Pope RN    [phone]: Infectious disease MD Wilson in to see pt and discuss tx  Vy Pope RN    [phone]: pt has insulin needle with liquid solution in it, friends present, ask pt to give this nurse the insulin needle pt refuses and rushes to bed with hand tucked under Lt thigh, security called  Vy Pope RN    [phone]: MD Teodoro Ortega notified pt has insulin needle in room with liquid in it secluded in her room, new orders noted  Vy Pope RN    [phone]: security speaks with pt about unwanted items in room during hospital stay, pt agrees to no unwanted items in room   NAFISA Jorgensen RN      26: pt transferred to [address] [place] [address] report given to [employer] with kardex, telemetry reading, and RADHA Lentz RN

## 2019-07-29 NOTE — PROGRESS NOTES
1600 Assumed pt care. Sallie Tyshawnexclayton 303 report from Ramila Macario. Pt admit for Cellulitis and Sepsis. RFA abscess covered w/ Mepelex. Contact precautions. Pt + for bedbugs and scabies. Sienna Benítez RN reports she walked into pt room. Pt visitor had syringe in hand and fresh, red, needle marks on inner arm. Pt and room searched. Visitors ran out of hospital. Security and Nursing supervisor made aware. \"No visitor\" order placed by Dr Landen Cornelius. Pt made aware. 1645 Entered to room to administer antibiotics and Lidocaine. Pt refused medications and stated she wants to [facility] now. \" Aarti RN informed Dr Landen Cornelius of pt decision. Landen Cornelius requested Dr Elle Sandy be paged. Informed Refusal form filled out by Notizza and signed by pt. Witnessed by this RN. Pt IV removed. Clean Mepilex placed on RFA abscess site. Site swollen w/ slight redness. Dr Elle Sandy spoke w/ pt and signed form. Informed Refusal given to Jhon Garibay RN, nurse supervisor. Aye Marina RN called security for pt personal belongings. Pt has personal items in hand. Pt left [facility].

## 2019-07-29 NOTE — PROGRESS NOTES
Transition of care: anticipate home when medically cleared  Met with patient at bedside. She has cellulitis of her arm. Met with Dr. Landen Cornelius patient needs Long term IV antibiotics. unfortunately patient is a a IV drug user and will not be able to send her home with long term Iv antibiotics. Patient has applied for medicaid pending. Patient lives in apartment with room mate. She does have a mother that lives in [facility]. Patient is also on isolation. Patient does not use DME. CM will continue to follow. Please let cm know how long patient will be on IV antibiotics. Patient will also need  PCP when d/c due to no insurance and no job may have to have [facility].  1200 received phone angelika from patients mother who is a RN. Cm did ask patients permission to talk to mother about her personal information including her drug abuse if mother asks. Cm she states yes you can discuss anything with mother. Cm called Miley patients mother she asked when patient would be d/c. expained to her that is up to the dr. She asked if medicaid would cover for her to have IV antb at home. Explained to her her she has medicaid pending. She asked if she could come home with IV atb. Explained to mother was she aware of her IV drug use she states she is aware of this. Explained to her that she would have to have a safe d/c and sending her home with a picc with hx of recent IV drug use is not safe. She asked cm if she could talk to the nurse nicki informed her cm will transfer her to the [facility]  Care Management Interventions  PCP Verified by CM:  Yes  Transition of Care Consult (CM Consult): Discharge Planning  Current Support Network: Own Home  Confirm Follow Up Transport: Friends  Plan discussed with Pt/Family/Caregiver: Yes  Discharge Location  Discharge Placement: Home

## 2019-07-29 NOTE — DISCHARGE SUMMARY
Onesimo Hayes is alert and oriented, and able to clearly report the risks of leaving the hospital w/o further intervention. had a long discussion with the patient at bedside regarding the risks of leaving the hospital AMA and benefits of further treatment. The patient's decisional capacity is intact and clearly understands what I am  recommending and why. The patient is fully aware of the risks including, but not limited to the following: worsening symptoms, decline in functional status, and even the possiblity of death. Discussed risk of vegetation to heart risk of paralysis as well if infection spreads to vertebra eect. .. The patient declines the recommendations at this time. The patient clearly understands that she may return to the ED at any time for further evaluation and treatment. The patient is adamant of leaving the hospital AMA.      Zoe Scanlon MD  7/29/2019 5:11 PM

## 2019-07-29 NOTE — PROGRESS NOTES
Hospitalist Progress Note    Patient: Holland Murcia MRN: [id number]  CSN: [id number]    YOB: 1995  Age: 25 y.o. Sex: female    DOA: 7/25/2019 LOS:  LOS: 4 days          Chief Complaint:    bacteremia      Assessment/Plan     Sepsis due to abscess of right arm and MSSA bacteremia-repeat cx are neg since 7/27    Scabies, possible-also has skin lesions c/w drug use  Treated with permethrin, contact isolation      Cocaine abuse-skin popping mode of entry    Vaginitis /vulvovaginitis -topival plus diflucan PO    ID consulted to help with length of treatment  Echo planned  If needs continued IV abx she is NOT a picc candidate due to drug abuse        Disposition :  Patient Active Problem List   Diagnosis Code    Cellulitis L03.90    Sepsis ([facility] [address] A41.9    Right ankle swelling M25.471    Positive blood culture R78.81    Vaginitis and vulvovaginitis N76.0       Subjective:  Wants to know when she can go home  Feels fine she reports  Denies new issue      Review of systems:    Constitutional: denies fevers, chills, myalgias    Gastrointestinal: denies nausea, vomiting, diarrhea      Vital signs/Intake and Output:  Visit Vitals  /60 (BP 1 Location: Left arm, BP Patient Position: At rest)   Pulse 65   Temp 98.4 °F (36.9 °C)   Resp 17   Ht 5' 5\" (1.651 m)   Wt 68.5 kg (151 lb)   SpO2 98%   Breastfeeding? No   BMI 25.13 kg/m²     Current Shift:  No intake/output data recorded.   Last three shifts:  07/27 1901 - 07/29 0700  In: 1050 [P.O.:540; I.V.:510]  Out: 400 [Urine:400]    Exam:    General: Well developed, alert, NAD, OX3  CVS:Regular rate and rhythm, no M/R/G, S1/S2 heard, no thrill  Lungs:Clear to auscultation bilaterally, no wheezes, rhonchi, or rales  Abdomen: Soft, Nontender, No distention, Normal Bowel sounds, No hepatomegaly  Extremities: right forearm abscess reducing in size but still firm, no active drainage seen  multiple excoriations on face and arms  Neuro:grossly normal , follows commands  Psych:appropriate                Labs: Results:       Chemistry Recent Labs     07/29/19  0515 07/28/19  0617 07/27/19  0546   GLU 90 92 93    142 140   K 3.7 3.3* 3.6    107 105   CO2 25 26 26   BUN 6* 5* 6*   CREA 0.49* 0.53* 0.59*   CA 8.1* 7.7* 7.7*   AGAP 8 9 9   BUCR 12 9* 10*   * 167* 143*   TP 5.8* 5.5* 5.2*   ALB 2.3* 2.2* 2.2*   GLOB 3.5 3.3 3.0   AGRAT 0.7* 0.7* 0.7*      CBC w/Diff Recent Labs     07/29/19 0515 07/28/19  0617 07/27/19  0546   WBC 4.8 7.4 7.3   RBC 3.47* 3.36* 3.43*   HGB 9.7* 9.4* 9.6*   HCT 29.7* 28.3* 28.8*    188 164      Cardiac Enzymes No results for input(s): CPK, CKND1, VANI in the last 72 hours. No lab exists for component: CKRMB, TROIP   Coagulation No results for input(s): PTP, INR, APTT in the last 72 hours. No lab exists for component: INREXT    Lipid Panel No results found for: CHOL, CHOLPOCT, CHOLX, CHLST, CHOLV, 758819, HDL, LDL, LDLC, DLDLP, 760210, VLDLC, VLDL, TGLX, TRIGL, TRIGP, TGLPOCT, CHHD, CHHDX   BNP No results for input(s): BNPP in the last 72 hours.    Liver Enzymes Recent Labs     07/29/19  0515   TP 5.8*   ALB 2.3*   *   SGOT 16      Thyroid Studies No results found for: T4, T3U, TSH, TSHEXT     Procedures/imaging: see electronic medical records for all procedures/Xrays and details which were not copied into this note but were reviewed prior to creation of Kaiden Og MD

## 2019-07-30 NOTE — PROGRESS NOTES
CDMP Queries    Hyponatremia due to bacteremia and sepsis, resolved. Severe bkuggi-awahje-jiaabsa hypotension, hyperlactatemia noted on admission.

## 2019-08-02 LAB
BACTERIA SPEC CULT: NORMAL
BACTERIA SPEC CULT: NORMAL
SERVICE CMNT-IMP: NORMAL
SERVICE CMNT-IMP: NORMAL

## 2019-12-06 ENCOUNTER — HOSPITAL ENCOUNTER (OUTPATIENT)
Age: 24
Setting detail: OBSERVATION
Discharge: HOME | End: 2019-12-08
Attending: EMERGENCY MEDICINE | Admitting: FAMILY MEDICINE
Payer: MEDICAID

## 2019-12-06 ENCOUNTER — APPOINTMENT (OUTPATIENT)
Dept: GENERAL RADIOLOGY | Age: 24
End: 2019-12-06
Attending: EMERGENCY MEDICINE
Payer: MEDICAID

## 2019-12-06 DIAGNOSIS — L03.021: Primary | ICD-10-CM

## 2019-12-06 PROBLEM — L08.9 INFECTION OF THUMB: Status: ACTIVE | Noted: 2019-12-06

## 2019-12-06 LAB
ALBUMIN SERPL-MCNC: 3.3 G/DL (ref 3.4–5)
ALBUMIN/GLOB SERPL: 0.9 {RATIO} (ref 0.8–1.7)
ALP SERPL-CCNC: 202 U/L (ref 45–117)
ALT SERPL-CCNC: 77 U/L (ref 13–56)
ANION GAP SERPL CALC-SCNC: 6 MMOL/L (ref 3–18)
AST SERPL-CCNC: 58 U/L (ref 10–38)
BASOPHILS # BLD: 0 K/UL (ref 0–0.1)
BASOPHILS NFR BLD: 0 % (ref 0–2)
BILIRUB SERPL-MCNC: 0.5 MG/DL (ref 0.2–1)
BUN SERPL-MCNC: 9 MG/DL (ref 7–18)
BUN/CREAT SERPL: 12 (ref 12–20)
CALCIUM SERPL-MCNC: 9 MG/DL (ref 8.5–10.1)
CHLORIDE SERPL-SCNC: 102 MMOL/L (ref 100–111)
CO2 SERPL-SCNC: 27 MMOL/L (ref 21–32)
CREAT SERPL-MCNC: 0.73 MG/DL (ref 0.6–1.3)
DIFFERENTIAL METHOD BLD: ABNORMAL
EOSINOPHIL # BLD: 0.1 K/UL (ref 0–0.4)
EOSINOPHIL NFR BLD: 1 % (ref 0–5)
ERYTHROCYTE [DISTWIDTH] IN BLOOD BY AUTOMATED COUNT: 14.9 % (ref 11.6–14.5)
GLOBULIN SER CALC-MCNC: 3.7 G/DL (ref 2–4)
GLUCOSE SERPL-MCNC: 99 MG/DL (ref 74–99)
HCT VFR BLD AUTO: 31.4 % (ref 35–45)
HGB BLD-MCNC: 9.8 G/DL (ref 12–16)
LYMPHOCYTES # BLD: 2 K/UL (ref 0.9–3.6)
LYMPHOCYTES NFR BLD: 19 % (ref 21–52)
MCH RBC QN AUTO: 24.8 PG (ref 24–34)
MCHC RBC AUTO-ENTMCNC: 31.2 G/DL (ref 31–37)
MCV RBC AUTO: 79.5 FL (ref 74–97)
MONOCYTES # BLD: 0.6 K/UL (ref 0.05–1.2)
MONOCYTES NFR BLD: 6 % (ref 3–10)
NEUTS SEG # BLD: 8.1 K/UL (ref 1.8–8)
NEUTS SEG NFR BLD: 74 % (ref 40–73)
PLATELET # BLD AUTO: 288 K/UL (ref 135–420)
PMV BLD AUTO: 9.5 FL (ref 9.2–11.8)
POTASSIUM SERPL-SCNC: 3.8 MMOL/L (ref 3.5–5.5)
PROT SERPL-MCNC: 7 G/DL (ref 6.4–8.2)
RBC # BLD AUTO: 3.95 M/UL (ref 4.2–5.3)
SODIUM SERPL-SCNC: 135 MMOL/L (ref 136–145)
WBC # BLD AUTO: 10.8 K/UL (ref 4.6–13.2)

## 2019-12-06 PROCEDURE — 87070 CULTURE OTHR SPECIMN AEROBIC: CPT

## 2019-12-06 PROCEDURE — 96374 THER/PROPH/DIAG INJ IV PUSH: CPT

## 2019-12-06 PROCEDURE — 74011250637 HC RX REV CODE- 250/637: Performed by: FAMILY MEDICINE

## 2019-12-06 PROCEDURE — 75810000289 HC I&D ABSCESS SIMP/COMP/MULT

## 2019-12-06 PROCEDURE — 99285 EMERGENCY DEPT VISIT HI MDM: CPT

## 2019-12-06 PROCEDURE — 85025 COMPLETE CBC W/AUTO DIFF WBC: CPT

## 2019-12-06 PROCEDURE — 77030027138 HC INCENT SPIROMETER -A

## 2019-12-06 PROCEDURE — 96375 TX/PRO/DX INJ NEW DRUG ADDON: CPT

## 2019-12-06 PROCEDURE — 74011000258 HC RX REV CODE- 258: Performed by: PHYSICIAN ASSISTANT

## 2019-12-06 PROCEDURE — 87255 GENET VIRUS ISOLATE HSV: CPT

## 2019-12-06 PROCEDURE — 87040 BLOOD CULTURE FOR BACTERIA: CPT

## 2019-12-06 PROCEDURE — 80053 COMPREHEN METABOLIC PANEL: CPT

## 2019-12-06 PROCEDURE — 74011000250 HC RX REV CODE- 250: Performed by: PHYSICIAN ASSISTANT

## 2019-12-06 PROCEDURE — 99218 HC RM OBSERVATION: CPT

## 2019-12-06 PROCEDURE — 74011250636 HC RX REV CODE- 250/636: Performed by: FAMILY MEDICINE

## 2019-12-06 PROCEDURE — 87186 SC STD MICRODIL/AGAR DIL: CPT

## 2019-12-06 PROCEDURE — 74011000250 HC RX REV CODE- 250

## 2019-12-06 PROCEDURE — 87077 CULTURE AEROBIC IDENTIFY: CPT

## 2019-12-06 PROCEDURE — 74011250636 HC RX REV CODE- 250/636: Performed by: EMERGENCY MEDICINE

## 2019-12-06 PROCEDURE — 65270000029 HC RM PRIVATE

## 2019-12-06 PROCEDURE — 74011250636 HC RX REV CODE- 250/636: Performed by: PHYSICIAN ASSISTANT

## 2019-12-06 PROCEDURE — 73130 X-RAY EXAM OF HAND: CPT

## 2019-12-06 RX ORDER — HYDROMORPHONE HYDROCHLORIDE 1 MG/ML
0.5 INJECTION, SOLUTION INTRAMUSCULAR; INTRAVENOUS; SUBCUTANEOUS
Status: DISCONTINUED | OUTPATIENT
Start: 2019-12-06 | End: 2019-12-08 | Stop reason: HOSPADM

## 2019-12-06 RX ORDER — ACETAMINOPHEN 325 MG/1
650 TABLET ORAL
Status: DISCONTINUED | OUTPATIENT
Start: 2019-12-06 | End: 2019-12-08 | Stop reason: HOSPADM

## 2019-12-06 RX ORDER — BUPIVACAINE HYDROCHLORIDE 5 MG/ML
INJECTION, SOLUTION EPIDURAL; INTRACAUDAL
Status: COMPLETED
Start: 2019-12-06 | End: 2019-12-06

## 2019-12-06 RX ORDER — HYDROMORPHONE HYDROCHLORIDE 1 MG/ML
1 INJECTION, SOLUTION INTRAMUSCULAR; INTRAVENOUS; SUBCUTANEOUS ONCE
Status: COMPLETED | OUTPATIENT
Start: 2019-12-06 | End: 2019-12-06

## 2019-12-06 RX ORDER — HYDROCODONE BITARTRATE AND ACETAMINOPHEN 5; 325 MG/1; MG/1
1 TABLET ORAL
Status: DISCONTINUED | OUTPATIENT
Start: 2019-12-06 | End: 2019-12-07

## 2019-12-06 RX ORDER — KETOROLAC TROMETHAMINE 15 MG/ML
15 INJECTION, SOLUTION INTRAMUSCULAR; INTRAVENOUS
Status: COMPLETED | OUTPATIENT
Start: 2019-12-06 | End: 2019-12-06

## 2019-12-06 RX ORDER — BUPIVACAINE HYDROCHLORIDE 5 MG/ML
5 INJECTION, SOLUTION EPIDURAL; INTRACAUDAL
Status: COMPLETED | OUTPATIENT
Start: 2019-12-06 | End: 2019-12-06

## 2019-12-06 RX ORDER — NALOXONE HYDROCHLORIDE 0.4 MG/ML
0.4 INJECTION, SOLUTION INTRAMUSCULAR; INTRAVENOUS; SUBCUTANEOUS AS NEEDED
Status: DISCONTINUED | OUTPATIENT
Start: 2019-12-06 | End: 2019-12-08 | Stop reason: HOSPADM

## 2019-12-06 RX ORDER — DIPHENHYDRAMINE HCL 12.5MG/5ML
12.5 ELIXIR ORAL
Status: DISCONTINUED | OUTPATIENT
Start: 2019-12-06 | End: 2019-12-08 | Stop reason: HOSPADM

## 2019-12-06 RX ORDER — IBUPROFEN 200 MG
1 TABLET ORAL DAILY
Status: DISCONTINUED | OUTPATIENT
Start: 2019-12-07 | End: 2019-12-08 | Stop reason: HOSPADM

## 2019-12-06 RX ORDER — LIDOCAINE HYDROCHLORIDE 10 MG/ML
10 INJECTION INFILTRATION; PERINEURAL ONCE
Status: COMPLETED | OUTPATIENT
Start: 2019-12-06 | End: 2019-12-06

## 2019-12-06 RX ADMIN — KETOROLAC TROMETHAMINE 15 MG: 15 INJECTION, SOLUTION INTRAMUSCULAR; INTRAVENOUS at 17:14

## 2019-12-06 RX ADMIN — BUPIVACAINE HYDROCHLORIDE 25 MG: 5 INJECTION, SOLUTION EPIDURAL; INTRACAUDAL at 18:18

## 2019-12-06 RX ADMIN — VANCOMYCIN HYDROCHLORIDE 1250 MG: 1.25 INJECTION, POWDER, LYOPHILIZED, FOR SOLUTION INTRAVENOUS at 19:46

## 2019-12-06 RX ADMIN — HYDROMORPHONE HYDROCHLORIDE 1 MG: 1 INJECTION, SOLUTION INTRAMUSCULAR; INTRAVENOUS; SUBCUTANEOUS at 20:34

## 2019-12-06 RX ADMIN — WATER 1 G: 1 INJECTION INTRAMUSCULAR; INTRAVENOUS; SUBCUTANEOUS at 17:16

## 2019-12-06 RX ADMIN — HYDROCODONE BITARTRATE AND ACETAMINOPHEN 1 TABLET: 5; 325 TABLET ORAL at 21:24

## 2019-12-06 RX ADMIN — LIDOCAINE HYDROCHLORIDE 10 ML: 10 INJECTION, SOLUTION INFILTRATION; PERINEURAL at 18:05

## 2019-12-06 RX ADMIN — ACYCLOVIR SODIUM 570 MG: 50 INJECTION, SOLUTION INTRAVENOUS at 17:27
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
## 2019-12-06 NOTE — ED NOTES
Pt eloped from ED after speaking with NNAMDI but has now returned and is agreeable to current tx plan of blood draw and TV. Pt tearful and talking on the phone. Currently changing into a gown.

## 2019-12-06 NOTE — ED PROVIDER NOTES
EMERGENCY DEPARTMENT HISTORY AND PHYSICAL EXAM    Date: 12/6/2019  Patient Name: Sanjay Whiteside    History of Presenting Illness     Chief Complaint   Patient presents with    Finger Pain         History Provided By: Patient    3:22 PM  Sanjay Whiteside is a 25 y.o. female with PMHX of IVDA with recent hospitalization at [facility] for endocarditis, who presents to the emergency department C/O right thumb pain and swelling onset 1 week ago. Patient states her boyfriend had an abscess but she tried to pop over a week ago followed by redness swelling to her right thumb. She tried to pop it herself and noted some bloody purulent drainage. Patient denies any current IV drug abuse, currently on Suboxone and occasionally takes gabapentin. Pt denies injury, fever, and any other sxs or complaints. PCP: None    Current Facility-Administered Medications   Medication Dose Route Frequency Provider Last Rate Last Dose    cefTRIAXone (ROCEPHIN) 1 g in sterile water (preservative free) 10 mL IV syringe  1 g IntraVENous NOW Rafaela Cruz PA        acyclovir (ZOVIRAX) 570 mg in 0.9% sodium chloride 100 mL IVPB  10 mg/kg (Ideal) IntraVENous NOW LUCÍA Ramírez        vancomycin (VANCOCIN) 1,250 mg in 0.9% sodium chloride 250 mL (VIAL-MATE)  1,250 mg IntraVENous ONCE Esther Campuzano [place]        ketorolac (TORADOL) injection 15 mg  15 mg IntraVENous NOW Morgan Ortiz [place]         Current Outpatient Medications   Medication Sig Dispense Refill    amoxicillin-clavulanate (AUGMENTIN) 875-125 mg per tablet Take 1 Tab by mouth two (2) times a day. 28 Tab 0       Past History     Past Medical History:  History reviewed. No pertinent past medical history. Past Surgical History:  History reviewed. No pertinent surgical history. Family History:  History reviewed. No pertinent family history.     Social History:  Social History     Tobacco Use    Smoking status: Current Every Day Smoker    Smokeless tobacco: Current User   Substance Use Topics    Alcohol use: Yes    Drug use: Not on file       Allergies:  No Known Allergies      Review of Systems   Review of Systems   Constitutional: Negative for fever. Musculoskeletal: Positive for joint swelling and myalgias. Skin: Positive for color change and wound. All other systems reviewed and are negative. Physical Exam     Vitals:    12/06/19 1433   BP: 119/71   Pulse: (!) 104   Resp: 16   Temp: 98.7 °F (37.1 °C)   SpO2: 100%   Weight: 63.5 kg (140 lb)   Height: 5' 5\" (1.651 m)     Physical Exam  Vitals signs and nursing note reviewed. Constitutional:       General: She is not in acute distress. Appearance: Normal appearance. She is normal weight. Musculoskeletal:        Hands:    Skin:     Comments: Numerous track marks and fresh scabs and some bruising bilateral arms   Neurological:      General: No focal deficit present. Mental Status: She is alert. Psychiatric:         Mood and Affect: Mood normal.               Diagnostic Study Results     Labs -     Recent Results (from the past 12 hour(s))   CBC WITH AUTOMATED DIFF    Collection Time: 12/06/19  4:20 PM   Result Value Ref Range    WBC 10.8 4.6 - 13.2 K/uL    RBC 3.95 (L) 4.20 - 5.30 M/uL    HGB 9.8 (L) 12.0 - 16.0 g/dL    HCT 31.4 (L) 35.0 - 45.0 %    MCV 79.5 74.0 - 97.0 FL    MCH 24.8 24.0 - 34.0 PG    MCHC 31.2 31.0 - 37.0 g/dL    RDW 14.9 (H) 11.6 - 14.5 %    PLATELET 523 892 - 905 K/uL    MPV 9.5 9.2 - 11.8 FL    NEUTROPHILS 74 (H) 40 - 73 %    LYMPHOCYTES 19 (L) 21 - 52 %    MONOCYTES 6 3 - 10 %    EOSINOPHILS 1 0 - 5 %    BASOPHILS 0 0 - 2 %    ABS. NEUTROPHILS 8.1 (H) 1.8 - 8.0 K/UL    ABS. LYMPHOCYTES 2.0 0.9 - 3.6 K/UL    ABS. MONOCYTES 0.6 0.05 - 1.2 K/UL    ABS. EOSINOPHILS 0.1 0.0 - 0.4 K/UL    ABS.  BASOPHILS 0.0 0.0 - 0.1 K/UL    DF AUTOMATED         Radiologic Studies -   XR HAND RT MIN 3 V   Final Result   IMPRESSION:      Diffuse soft tissue swelling of the thumb without soft tissue gas. No osseous   signs of osteomyelitis. CT Results  (Last 48 hours)    None        CXR Results  (Last 48 hours)    None          Medications given in the ED-  Medications   cefTRIAXone (ROCEPHIN) 1 g in sterile water (preservative free) 10 mL IV syringe (has no administration in time range)   acyclovir (ZOVIRAX) 570 mg in 0.9% sodium chloride 100 mL IVPB (has no administration in time range)   vancomycin (VANCOCIN) 1,250 mg in 0.9% sodium chloride 250 mL (VIAL-MATE) (has no administration in time range)   ketorolac (TORADOL) injection 15 mg (has no administration in time range)         Medical Decision Making   I am the first provider for this patient. I reviewed the vital signs, available nursing notes, past medical history, past surgical history, family history and social history. Vital Signs-Reviewed the patient's vital signs. Records Reviewed: Nursing Notes and Old Medical Records      Procedures:  Procedures    ED Course:  3:22 PM   Initial assessment performed. The patients presenting problems have been discussed, and they are in agreement with the care plan formulated and outlined with them. I have encouraged them to ask questions as they arise throughout their visit. 3:35 PM consult note  Case discussed with ED attending Dr. Sara Beth, who agrees with calling Ortho-hand for further management, blood cultures, IV antibiotics and admission. FACE-TO-FACE PROGRESS NOTE:  3:30 PM  The patient presents with infection of right thumb. Patient reports she was recently hospitalized at [facility] for endocarditis and was helping take care of her boyfriend who has an abscess and developed a pus pocket on the tip of her thumb. She states she popped it at home but the thumb has become progressively more erythematous and swollen with streaking going up her arms. She denies that she is still using IV drugs.   My exam shows severely erythematous and edematous right thumb with purulent distal tip and red streaking going up the arm that is exquisitely tender to palpation with stigmata of IV drug use on both upper extremities. Imp/plan: Patient will require blood cultures and wound culture, IV antibiotics, and consultation with a hand surgeon. I have personally seen and examined the patient, reviewed the YO's note and agree with findings and plan. Written by Dr. Naman Worley    4:00 PM Consult Note  [facility] Delroy [facility]att. His nurse states he will call back. 4:03 PM consult note  Case discussed with plastic surgeon Dr. Palmira Mcbride who states that he is unable to take this case today. 4:20 p.m. Consult note  Case discussed with Ortho/hand Dr. Delroy Platt who states that he will take the case. He requests admission to the hospitalist and he will plan to take her to the OR for I&D in the morning. 4:45 PM Consult Noted  Case discussed with hospitalist Dr. Angelo Valenzuela, who will admit patient to medical.  She is aware that labs are pending. Diagnosis and Disposition       ADMISSION NOTE:  4:45 PM  Patient is being admitted to the hospital by Dr. Angelo Valenzuela. The results of their tests and reasons for their admission have been discussed with them and/or available family. They convey agreement and understanding for the need to be admitted and for their admission diagnosis. CONDITIONS ON ADMISSION:  Deep Vein Thrombosis is not present at the time of admission. Thrombosis is not present at the time of admission. Urinary Tract Infection is not present at the time of admission. Pneumonia is not present at the time of admission. MRSA is possible at the time of admission. Wound infection is present at the time of admission. Pressure Ulcer is not present at the time of admission. CLINICAL IMPRESSION:    1. Felon with lymphangitis, right        PLAN:    1. Admit        Please note that this dictation was completed with [employer], the computer voice recognition software. Quite often unanticipated grammatical, syntax, homophones, and other interpretive errors are inadvertently transcribed by the computer software. Please disregard these errors. Please excuse any errors that have escaped final proofreading.

## 2019-12-07 ENCOUNTER — ANESTHESIA (OUTPATIENT)
Dept: SURGERY | Age: 24
End: 2019-12-07
Payer: MEDICAID

## 2019-12-07 ENCOUNTER — ANESTHESIA EVENT (OUTPATIENT)
Dept: SURGERY | Age: 24
End: 2019-12-07
Payer: MEDICAID

## 2019-12-07 LAB
ANION GAP SERPL CALC-SCNC: 5 MMOL/L (ref 3–18)
BUN SERPL-MCNC: 11 MG/DL (ref 7–18)
BUN/CREAT SERPL: 14 (ref 12–20)
CALCIUM SERPL-MCNC: 8.3 MG/DL (ref 8.5–10.1)
CHLORIDE SERPL-SCNC: 106 MMOL/L (ref 100–111)
CO2 SERPL-SCNC: 27 MMOL/L (ref 21–32)
CREAT SERPL-MCNC: 0.78 MG/DL (ref 0.6–1.3)
ERYTHROCYTE [DISTWIDTH] IN BLOOD BY AUTOMATED COUNT: 15.3 % (ref 11.6–14.5)
GLUCOSE SERPL-MCNC: 91 MG/DL (ref 74–99)
HCG UR QL: NEGATIVE
HCT VFR BLD AUTO: 30.1 % (ref 35–45)
HGB BLD-MCNC: 9.4 G/DL (ref 12–16)
INR PPP: 1.1 (ref 0.8–1.2)
MCH RBC QN AUTO: 25.1 PG (ref 24–34)
MCHC RBC AUTO-ENTMCNC: 31.2 G/DL (ref 31–37)
MCV RBC AUTO: 80.3 FL (ref 74–97)
PLATELET # BLD AUTO: 268 K/UL (ref 135–420)
PMV BLD AUTO: 9.8 FL (ref 9.2–11.8)
POTASSIUM SERPL-SCNC: 4.1 MMOL/L (ref 3.5–5.5)
PROTHROMBIN TIME: 14.4 SEC (ref 11.5–15.2)
RBC # BLD AUTO: 3.75 M/UL (ref 4.2–5.3)
SODIUM SERPL-SCNC: 138 MMOL/L (ref 136–145)
WBC # BLD AUTO: 8 K/UL (ref 4.6–13.2)

## 2019-12-07 PROCEDURE — 76060000032 HC ANESTHESIA 0.5 TO 1 HR: Performed by: ORTHOPAEDIC SURGERY

## 2019-12-07 PROCEDURE — 74011000250 HC RX REV CODE- 250: Performed by: NURSE ANESTHETIST, CERTIFIED REGISTERED

## 2019-12-07 PROCEDURE — 74011250636 HC RX REV CODE- 250/636: Performed by: ORTHOPAEDIC SURGERY

## 2019-12-07 PROCEDURE — 74011250636 HC RX REV CODE- 250/636: Performed by: NURSE ANESTHETIST, CERTIFIED REGISTERED

## 2019-12-07 PROCEDURE — 65270000029 HC RM PRIVATE

## 2019-12-07 PROCEDURE — 76010000138 HC OR TIME 0.5 TO 1 HR: Performed by: ORTHOPAEDIC SURGERY

## 2019-12-07 PROCEDURE — 81025 URINE PREGNANCY TEST: CPT

## 2019-12-07 PROCEDURE — 85610 PROTHROMBIN TIME: CPT

## 2019-12-07 PROCEDURE — 99218 HC RM OBSERVATION: CPT

## 2019-12-07 PROCEDURE — 80048 BASIC METABOLIC PNL TOTAL CA: CPT

## 2019-12-07 PROCEDURE — 74011250637 HC RX REV CODE- 250/637: Performed by: ORTHOPAEDIC SURGERY

## 2019-12-07 PROCEDURE — 85027 COMPLETE CBC AUTOMATED: CPT

## 2019-12-07 PROCEDURE — 77010033678 HC OXYGEN DAILY

## 2019-12-07 PROCEDURE — 77030040361 HC SLV COMPR DVT MDII -B: Performed by: ORTHOPAEDIC SURGERY

## 2019-12-07 PROCEDURE — 36415 COLL VENOUS BLD VENIPUNCTURE: CPT

## 2019-12-07 PROCEDURE — 74011000250 HC RX REV CODE- 250: Performed by: ORTHOPAEDIC SURGERY

## 2019-12-07 PROCEDURE — 77030020268 HC MISC GENERAL SUPPLY: Performed by: ORTHOPAEDIC SURGERY

## 2019-12-07 PROCEDURE — 74011250636 HC RX REV CODE- 250/636: Performed by: FAMILY MEDICINE

## 2019-12-07 RX ORDER — MIDAZOLAM HYDROCHLORIDE 1 MG/ML
INJECTION, SOLUTION INTRAMUSCULAR; INTRAVENOUS AS NEEDED
Status: DISCONTINUED | OUTPATIENT
Start: 2019-12-07 | End: 2019-12-07 | Stop reason: HOSPADM

## 2019-12-07 RX ORDER — LIDOCAINE HYDROCHLORIDE 20 MG/ML
INJECTION, SOLUTION EPIDURAL; INFILTRATION; INTRACAUDAL; PERINEURAL AS NEEDED
Status: DISCONTINUED | OUTPATIENT
Start: 2019-12-07 | End: 2019-12-07 | Stop reason: HOSPADM

## 2019-12-07 RX ORDER — OXYCODONE HYDROCHLORIDE 5 MG/1
5-15 TABLET ORAL
Status: DISCONTINUED | OUTPATIENT
Start: 2019-12-07 | End: 2019-12-08 | Stop reason: HOSPADM

## 2019-12-07 RX ORDER — OXYCODONE AND ACETAMINOPHEN 5; 325 MG/1; MG/1
1 TABLET ORAL AS NEEDED
Status: CANCELLED | OUTPATIENT
Start: 2019-12-07

## 2019-12-07 RX ORDER — SODIUM CHLORIDE, SODIUM LACTATE, POTASSIUM CHLORIDE, CALCIUM CHLORIDE 600; 310; 30; 20 MG/100ML; MG/100ML; MG/100ML; MG/100ML
50 INJECTION, SOLUTION INTRAVENOUS CONTINUOUS
Status: CANCELLED | OUTPATIENT
Start: 2019-12-07

## 2019-12-07 RX ORDER — ACETAMINOPHEN 500 MG
1000 TABLET ORAL EVERY 8 HOURS
Status: DISCONTINUED | OUTPATIENT
Start: 2019-12-07 | End: 2019-12-08 | Stop reason: HOSPADM

## 2019-12-07 RX ORDER — ONDANSETRON 2 MG/ML
4 INJECTION INTRAMUSCULAR; INTRAVENOUS ONCE
Status: CANCELLED | OUTPATIENT
Start: 2019-12-07 | End: 2019-12-07

## 2019-12-07 RX ORDER — SODIUM CHLORIDE, SODIUM LACTATE, POTASSIUM CHLORIDE, CALCIUM CHLORIDE 600; 310; 30; 20 MG/100ML; MG/100ML; MG/100ML; MG/100ML
INJECTION, SOLUTION INTRAVENOUS
Status: DISCONTINUED | OUTPATIENT
Start: 2019-12-07 | End: 2019-12-07 | Stop reason: HOSPADM

## 2019-12-07 RX ORDER — PROPOFOL 10 MG/ML
INJECTION, EMULSION INTRAVENOUS AS NEEDED
Status: DISCONTINUED | OUTPATIENT
Start: 2019-12-07 | End: 2019-12-07 | Stop reason: HOSPADM

## 2019-12-07 RX ORDER — KETOROLAC TROMETHAMINE 30 MG/ML
30 INJECTION, SOLUTION INTRAMUSCULAR; INTRAVENOUS
Status: DISCONTINUED | OUTPATIENT
Start: 2019-12-07 | End: 2019-12-08 | Stop reason: HOSPADM

## 2019-12-07 RX ORDER — NALOXONE HYDROCHLORIDE 0.4 MG/ML
0.1 INJECTION, SOLUTION INTRAMUSCULAR; INTRAVENOUS; SUBCUTANEOUS
Status: CANCELLED | OUTPATIENT
Start: 2019-12-07

## 2019-12-07 RX ORDER — KETAMINE HYDROCHLORIDE 10 MG/ML
INJECTION, SOLUTION INTRAMUSCULAR; INTRAVENOUS AS NEEDED
Status: DISCONTINUED | OUTPATIENT
Start: 2019-12-07 | End: 2019-12-07 | Stop reason: HOSPADM

## 2019-12-07 RX ADMIN — PROPOFOL 50 MG: 10 INJECTION, EMULSION INTRAVENOUS at 14:30

## 2019-12-07 RX ADMIN — CEFTRIAXONE 2 G: 2 INJECTION, POWDER, FOR SOLUTION INTRAMUSCULAR; INTRAVENOUS at 16:23

## 2019-12-07 RX ADMIN — LIDOCAINE HYDROCHLORIDE 60 MG: 20 INJECTION, SOLUTION EPIDURAL; INFILTRATION; INTRACAUDAL; PERINEURAL at 14:30

## 2019-12-07 RX ADMIN — PROPOFOL 50 MG: 10 INJECTION, EMULSION INTRAVENOUS at 14:33

## 2019-12-07 RX ADMIN — PROPOFOL 50 MG: 10 INJECTION, EMULSION INTRAVENOUS at 14:34

## 2019-12-07 RX ADMIN — SODIUM CHLORIDE, SODIUM LACTATE, POTASSIUM CHLORIDE, AND CALCIUM CHLORIDE: 600; 310; 30; 20 INJECTION, SOLUTION INTRAVENOUS at 14:23

## 2019-12-07 RX ADMIN — KETOROLAC TROMETHAMINE 30 MG: 30 INJECTION, SOLUTION INTRAMUSCULAR; INTRAVENOUS at 18:25

## 2019-12-07 RX ADMIN — KETAMINE HYDROCHLORIDE 20 MG: 10 INJECTION, SOLUTION INTRAMUSCULAR; INTRAVENOUS at 14:30

## 2019-12-07 RX ADMIN — MIDAZOLAM 2 MG: 1 INJECTION INTRAMUSCULAR; INTRAVENOUS at 14:24

## 2019-12-07 RX ADMIN — HYDROMORPHONE HYDROCHLORIDE 0.5 MG: 1 INJECTION, SOLUTION INTRAMUSCULAR; INTRAVENOUS; SUBCUTANEOUS at 16:22

## 2019-12-07 RX ADMIN — VANCOMYCIN HYDROCHLORIDE 1250 MG: 1.25 INJECTION, POWDER, LYOPHILIZED, FOR SOLUTION INTRAVENOUS at 06:30

## 2019-12-07 RX ADMIN — OXYCODONE HYDROCHLORIDE 10 MG: 5 TABLET ORAL at 22:27

## 2019-12-07 RX ADMIN — PROPOFOL 50 MG: 10 INJECTION, EMULSION INTRAVENOUS at 14:35

## 2019-12-07 RX ADMIN — KETAMINE HYDROCHLORIDE 10 MG: 10 INJECTION, SOLUTION INTRAMUSCULAR; INTRAVENOUS at 14:32

## 2019-12-07 RX ADMIN — PROPOFOL 50 MG: 10 INJECTION, EMULSION INTRAVENOUS at 14:31

## 2019-12-07 RX ADMIN — OXYCODONE HYDROCHLORIDE 5 MG: 5 TABLET ORAL at 18:40

## 2019-12-07 RX ADMIN — HYDROMORPHONE HYDROCHLORIDE 0.5 MG: 1 INJECTION, SOLUTION INTRAMUSCULAR; INTRAVENOUS; SUBCUTANEOUS at 06:27

## 2019-12-07 RX ADMIN — LIDOCAINE HYDROCHLORIDE 20 MG: 20 INJECTION, SOLUTION EPIDURAL; INFILTRATION; INTRACAUDAL; PERINEURAL at 14:33

## 2019-12-07 RX ADMIN — VANCOMYCIN HYDROCHLORIDE 1250 MG: 1.25 INJECTION, POWDER, LYOPHILIZED, FOR SOLUTION INTRAVENOUS at 18:25

## 2019-12-07 RX ADMIN — HYDROMORPHONE HYDROCHLORIDE 0.5 MG: 1 INJECTION, SOLUTION INTRAMUSCULAR; INTRAVENOUS; SUBCUTANEOUS at 10:56

## 2019-12-07 RX ADMIN — OXYCODONE HYDROCHLORIDE 5 MG: 5 TABLET ORAL at 18:25

## 2019-12-07 RX ADMIN — KETAMINE HYDROCHLORIDE 10 MG: 10 INJECTION, SOLUTION INTRAMUSCULAR; INTRAVENOUS at 14:43

## 2019-12-07 RX ADMIN — PROPOFOL 50 MG: 10 INJECTION, EMULSION INTRAVENOUS at 14:32

## 2019-12-07 RX ADMIN — ACETAMINOPHEN 1000 MG: 500 TABLET ORAL at 18:25

## 2019-12-07 RX ADMIN — HYDROMORPHONE HYDROCHLORIDE 0.5 MG: 1 INJECTION, SOLUTION INTRAMUSCULAR; INTRAVENOUS; SUBCUTANEOUS at 20:11

## 2019-12-07 NOTE — PROGRESS NOTES
Bedside shift change report given to Caridad Hudson RN (oncoming nurse) by Aris Hurley RN (offgoing nurse). Report included the following information SBAR, Kardex, Procedure Summary, Intake/Output, MAR, Accordion and Recent Results.

## 2019-12-07 NOTE — PROGRESS NOTES
Hospitalist Progress Note    Patient: Suzie Albarado MRN: [id number]  CSN: [id number]    YOB: 1995  Age: 25 y.o. Sex: female    DOA: 12/6/2019 LOS:  LOS: 1 day          Chief Complaint:    RIGHT Thumb infection and pain     Assessment/Plan     24 y/o F with h/o IVDU. Admitted for right thumb infection with ulceration and soft tissue compromise, possible early flexor tenosynovitis with associated cellulitis. S/p I&D in ED by DR. Ortega     Continue IV Rocephin and Vancomycin     Dr. Kirit Alvarez consulting for hand orthopedic surgery -plan is if there was no significant improvement overnight, will repeat incision and debridement in the operating room today, keep NPO after midnight, keep hand elevated and maintain pain control      Disposition : Home   Patient Active Problem List   Diagnosis Code    Cellulitis L03.90    Sepsis ([facility] [address] A41.9    Right ankle swelling M25.471    Positive blood culture R78.81    Vaginitis and vulvovaginitis N76.0    Infection of thumb L08.9       Subjective:  Pt more upset this am, says pain medicine is not enough  Turns herself away from examiner and turned to the wall. Review of systems:    Constitutional: denies fevers, chills, myalgias  Respiratory: denies SOB, cough  Cardiovascular: denies chest pain, palpitations  Gastrointestinal: denies nausea, vomiting, diarrhea      Vital signs/Intake and Output:  Visit Vitals  /59   Pulse 81   Temp 98.7 °F (37.1 °C)   Resp 16   Ht 5' 5\" (1.651 m)   Wt 62.4 kg (137 lb 8 oz)   SpO2 98%   Breastfeeding No   BMI 22.88 kg/m²     Current Shift:  No intake/output data recorded.   Last three shifts:  12/05 1901 - 12/07 0700  In: 250 [I.V.:250]  Out: -     Exam:    General: Well developed, alert, NAD, OX3  Head/Neck: NCAT, supple, No masses, No lymphadenopathy  CVS:Regular rate and rhythm, no M/R/G, S1/S2 heard, no thrill  Lungs:Clear to auscultation bilaterally, no wheezes, rhonchi, or rales  Abdomen: Soft, Nontender, No distention, Normal Bowel sounds, No hepatomegaly  Extremities: Right thumb is wrapped in a bandage after I&D procedure. Erythema extending up arm with streaking extending all of the way to her hand  Skin:normal texture and turgor, no rashes, no lesions  Neuro:grossly normal , follows commands  Psych:appropriate                                  Labs: Results:       Chemistry Recent Labs     12/07/19 0420 12/06/19  1620   GLU 91 99    135*   K 4.1 3.8    102   CO2 27 27   BUN 11 9   CREA 0.78 0.73   CA 8.3* 9.0   AGAP 5 6   BUCR 14 12   AP  --  202*   TP  --  7.0   ALB  --  3.3*   GLOB  --  3.7   AGRAT  --  0.9      CBC w/Diff Recent Labs     12/07/19 0420 12/06/19  1620   WBC 8.0 10.8   RBC 3.75* 3.95*   HGB 9.4* 9.8*   HCT 30.1* 31.4*    288   GRANS  --  74*   LYMPH  --  19*   EOS  --  1      Cardiac Enzymes No results for input(s): CPK, CKND1, VANI in the last 72 hours. No lab exists for component: CKRMB, TROIP   Coagulation Recent Labs     12/07/19  0420   PTP 14.4   INR 1.1       Lipid Panel No results found for: CHOL, CHOLPOCT, CHOLX, CHLST, CHOLV, 891859, HDL, HDLP, LDL, LDLC, DLDLP, 411798, VLDLC, VLDL, TGLX, TRIGL, TRIGP, TGLPOCT, CHHD, CHHDX   BNP No results for input(s): BNPP in the last 72 hours.    Liver Enzymes Recent Labs     12/06/19  1620   TP 7.0   ALB 3.3*   *   SGOT 58*      Thyroid Studies No results found for: T4, T3U, TSH, TSHEXT     Procedures/imaging: see electronic medical records for all procedures/Xrays and details which were not copied into this note but were reviewed prior to creation of Jon Diehl MD

## 2019-12-07 NOTE — PERIOP NOTES
TRANSFER - OUT REPORT:    Verbal report given to oskar Rodriguez rn on Lorie Crowder  being transferred to   for routine progression of care       Report consisted of patients Situation, Background, Assessment and   Recommendations(SBAR). Information from the following report(s) OR Summary was reviewed with the receiving nurse. Opportunity for questions and clarification was provided.       Patient transported with:   Registered Nurse

## 2019-12-07 NOTE — BRIEF OP NOTE
BRIEF OPERATIVE NOTE    Date of Procedure: 12/7/2019   Preoperative Diagnosis: RIGHT THUMB ABCESS  Postoperative Diagnosis: RIGHT THUMB ABCESS    Procedure(s):  IRRIGATION AND DEBRIDEMENT RIGHT HAND  Surgeon(s) and Role:     Abbey Szymanski MD - Primary         Surgical Assistant: none    Surgical Staff:  Circ-1: Stephanie Islas RN  Scrub Tech-1: Thelma Jacinto  Surg Asst-1: Cullen Edgar  Event Time In Time Out   Incision Start 12/07/2019 1446    Incision Close 12/07/2019 1501      Anesthesia: MAC   Estimated Blood Loss: none  Specimens: * No specimens in log *   Findings: marginal perfusion of thumb with vascular compromise pulp  Purulence in thenar cone without evidence of flexor tenosynovitis    Complications: none  Implants: * No implants in log *

## 2019-12-07 NOTE — ANESTHESIA POSTPROCEDURE EVALUATION
Post-Anesthesia Evaluation and Assessment    Cardiovascular Function/Vital Signs  Visit Vitals  /49   Pulse 92   Temp 36.8 °C (98.3 °F)   Resp 20   Ht 5' 5\" (1.651 m)   Wt 62.4 kg (137 lb 8 oz)   SpO2 100%   Breastfeeding No   BMI 22.88 kg/m²       Patient is status post Procedure(s):  IRRIGATION AND DEBRIDEMENT RIGHT HAND. Nausea/Vomiting: Controlled. Postoperative hydration reviewed and adequate. Pain:  Pain Scale 1: Numeric (0 - 10) (12/07/19 0707)  Pain Intensity 1: 0 (12/07/19 0707)   Managed. Neurological Status: appropriate for discharge to floor        Mental Status and Level of Consciousness: Baseline and appropriate for discharge. Pulmonary Status:   O2 Device: Room air (12/07/19 1508)   Adequate oxygenation and airway patent. Complications related to anesthesia: None    Post-anesthesia assessment completed. No concerns. Patient has met all discharge requirements.     Signed By: Zaid Otero MD    December 7, 2019

## 2019-12-07 NOTE — PROGRESS NOTES
Upon shift change patient's pain to right thumb unbearable. Upon reassessment, patient sleeping quietly without sx of pain and discomfort. Dressing to right thumb CDI. Coloration to right hand WNL. Radial pulse palpable. Unable to assess cap refill or coloration to right thumb due to bulky dressing. Patient Vitals for the past 8 hrs:   Temp Pulse Resp BP SpO2   12/07/19 0454 98.1 °F (36.7 °C) 72 17 98/67 98 %   12/06/19 2311 98.2 °F (36.8 °C) 84 16 117/42 98 %         0627 - Patient c/o pain to right thumb rating at 10/10 which is throbbing in nature. Dilaudid 0.5 mg IV given, which provided comfort to where patient sleeping.

## 2019-12-07 NOTE — PROGRESS NOTES
0173 - Bedside shift report received from Fani Yun RN. Assumed care of patient. Patient noted resting in bed at this time. Call light in reach. 7669 - Attempted to administer nicotine patch. Patient refused and rolled over and went back to sleep. Call light in reach. 1056 - Assessment completed as per flowsheet. Patient alert and oriented x4. Bulky gauze dressing noted CDI to right thumb. PRN Dilaudid administered for 8/10 pain to right thumb. Patient in bed with call light in reach. 1143 - Patient reports pain medication ineffective. Page out to Dr. Vero Monique to notify. Awaiting return call. 5 - Spoke with Dr. Urbano Solano about patient's concerns for surgery. Dr. Urbano Solano transferred to room phone to speak with patient. Patient now agreeable to go down for surgery. 1400 - Off floor for scheduled procedure. 1545 - Patient back on floor. Alert and oriented x4. Noted tearful and c/o pain to right thumb/hand. Right thumb noted with bulky dressing. Swelling noted to right hand. Patient resting in bed with sister at bedside. Call light in reach. 1622 - PRN Dilaudid administered for c/o 10/10 pain to right thumb. [address] with Dr. Urbano Solano about patient's unrelieved pain and received new orders to D/C Diagonal and start Toradol 30 mg IV q8h PRN, Oxycodone 5mg 1-3 tablets q4h PRN pain, and Tylenol 1000 mg q8h.     1825 - PRN Toradol and Oxycodone administered for c/o 9/10 pain to right thumb. Patient in bed with call light in reach.

## 2019-12-07 NOTE — PROGRESS NOTES
Antimicrobial Stewardship Team Note    Broad Spectrum Antimicrobial Recommendation    Patient: Swapna Dale  MRN#: [id number]  Attending: No name on file. Admission Date: 120619    Current Antimicrobial Medications  Current Antimicrobial Therapy (168h ago, onward)      Ordered     Start Stop    12/06/19 2104  cefTRIAXone (ROCEPHIN) 2 g in sterile water (preservative free) 20 mL IV syringe  2 g,   IntraVENous,   EVERY 24 HOURS      12/07/19 1700 12/14/19 1659    12/06/19 2201  vancomycin (VANCOCIN) 1,250 mg in 0.9% sodium chloride 250 mL (VIAL-MATE)  1,250 mg,   IntraVENous,   EVERY 12 HOURS      12/07/19 0700 12/14/19 0659              Current Indication/Therapy  Vancomycin dose given in ER  = 1250 mg   MD = vancomycin 1250 mg IV q 12 hr    Assessment/Recommendation  Estimated Pharmacokinetic Parameters (based on population kinetics)  Vd: 44 L (0.7 L/kg)  Jarrett: 0.083 hr-1 (T1/2 = 8.3 hrs)    Dosing Recommendations  Vancomycin dose: 1250 mg IV Q12hrs (infused over 1 hr)  Estimated peak: 42.8 mcg/mL  Estimated trough: 17.2 mcg/mL  Estimated AUC:PHILIP: 678 mcg*hr/mL (assumed PHILIP 1 mcg/mL)    A/P:  1. Recommend vancomycin 1250 mg IV Q12hrs (20 mg/kg)  2. Consider a vancomycin trough level prior to the 4th dose. 3. Please monitor renal function (urine output, BUN/SCr). Dose adjustments may be necessary with a significant change in renal function.     Submitted by: Manuel Almanza, [address] [facility]

## 2019-12-07 NOTE — ED NOTES
Spoke with pharmacy for the 4th time and they will be restocking the vancomycin. I reordered the medication to reschedule it per their instructions.

## 2019-12-07 NOTE — PROGRESS NOTES
Letter of Status Determination:    Recommend Changing patient status from   INPATIENT to OBSERVATION      Pt Name:  Beverly Tlaavera   MR#  [id number]   [facility]#   [id number]   Room and Hospital  OR/PL  @ [facility]   Hospitalization date  12/6/2019  2:44 PM   Current Attending Physician  Damion Monaco, *   Principal diagnosis  <principal problem not specified>      Clinicals  25 y.o. y.o  female hospitalized with   Dr. Delroy Platt consulting for hand orthopedic surgery -right thumb felon with ulceration and soft tissue compromise, possible early flexor tenosynovitis, performed I&D at bedside, will wait and see if improvement overnight, if not he will repeat incision and debridement in the operating room tomorrow.      [place] MCG criteria   Does apply    STATUS DETERMINATION  On the basis of review of available clinical data, documentation in the chart  It is our recommendation that the patient's status is changed from INPATIENT to OBSERVATION         The final decision of the patient's hospitalization status depends on the attending physician's judgment      Additional comments     Insurance  [facility] / Plan: [address] / Product Type: Medicaid /             Ramon Closs MD FACP   Physician Advisor   [employer] [facility] [employer] Phone:     SubscriberVerneita Failing Subscriber#: [id number]    Group#:  Precert#:                 12/7/2019 3:01 PM [id number] INPATIENT OR/PL [facility] Juan Crowder [facility]or: [facility] / Plan: [address] / Product Type: Medicaid /    Simuel Rinne

## 2019-12-07 NOTE — PROGRESS NOTES
Date of Surgery Update:  Yazan Mariee was seen and examined. Right thumb persistent swelling and redness with drainage. .. marginal perfusion. Repeat debridement.  Consent obtained    Signed By: Shawn Epperson MD     December 7, 2019 1:59 PM

## 2019-12-07 NOTE — H&P
History & Physical    Patient: Cristian Tony MRN: [id number]  CSN: [id number]    YOB: 1995  Age: 25 y.o. Sex: female      DOA: 12/6/2019  Primary Care Provider:  None      Assessment/Plan   24 y/o F with h/o IVDU. Admitted for right thumb infection with associated cellulitis. Medical floor  IV Rocephin and Vancomycin   Dr. Mclean Im consulting for hand orthopedic surgery -right thumb felon with ulceration and soft tissue compromise, possible early flexor tenosynovitis, performed I&D at bedside, will wait and see if improvement overnight, if not he will repeat incision and debridement in the operating room tomorrow. Follow wound cultures  NPO after midnight  Keep digit elevation  Pain control      Patient Active Problem List   Diagnosis Code    Cellulitis L03.90    Sepsis ([address] A41.9    Right ankle swelling M25.471    Positive blood culture R78.81    Vaginitis and vulvovaginitis N76.0    Infection of thumb L08.9       Estimated length of stay : 2 days     CC: Right thumb pain        HPI:     Cristian Tony is a 25 y.o. female with PMHX of IVDA with recent hospitalization at [facility] for endocarditis, who presents to the emergency department C/O right thumb pain and swelling onset 1 week ago. Patient states her boyfriend had an abscess but she tried to pop over a week ago followed by redness swelling to her right thumb. She tried to pop it herself and noted some bloody purulent drainage. Patient denies any current IV drug abuse, currently on Suboxone and occasionally takes gabapentin. Pt denies injury, fever, and any other sxs or complaints. History reviewed. No pertinent past medical history. History reviewed. No pertinent surgical history. History reviewed. No pertinent family history.     Social History     Socioeconomic History    Marital status: SINGLE     Spouse name: Not on file    Number of children: Not on file    Years of education: Not on file    Highest education level: Not on file   Tobacco Use    Smoking status: Current Every Day Smoker    Smokeless tobacco: Current User   Substance and Sexual Activity    Alcohol use: Yes       Prior to Admission medications    Medication Sig Start Date End Date Taking? Authorizing Provider   amoxicillin-clavulanate (AUGMENTIN) 875-125 mg per tablet Take 1 Tab by mouth two (2) times a day. 19   Richard Romeo MD       No Known Allergies    Review of Systems  Gen: No fever, chills, malaise, weight loss/gain. Heent: No headache, rhinorrhea, epistaxis, ear pain, hearing loss, sinus pain, neck pain/stiffness, sore throat. Heart: No chest pain, palpitations, VIDALES, pnd, or orthopnea. Resp: No cough, hemoptysis, wheezing and shortness of breath. GI: No nausea, vomiting, diarrhea, constipation, melena or hematochezia. : No urinary obstruction, dysuria or hematuria. Derm: No rash, new skin lesion or pruritis. Musc/skeletal: no bone or joint complains. Vasc: No edema, cyanosis or claudication. Endo: No heat/cold intolerance, no polyuria,polydipsia or polyphagia. Neuro: No unilateral weakness, numbness, tingling. No seizures. Heme: No easy bruising or bleeding. Physical Exam:     Physical Exam:  Visit Vitals  BP 98/46   Pulse 92   Temp 98.7 °F (37.1 °C)   Resp 17   Ht 5' 5\" (1.651 m)   Wt 63.5 kg (140 lb)   SpO2 100%   BMI 23.30 kg/m²      O2 Device: Room air    Temp (24hrs), Av.7 °F (37.1 °C), Min:98.7 °F (37.1 °C), Max:98.7 °F (37.1 °C)    No intake/output data recorded. No intake/output data recorded. General:  Awake, cooperative, no distress. Head:  Normocephalic, without obvious abnormality, atraumatic. Eyes:  Conjunctivae/corneas clear, sclera anicteric, PERRL, EOMs intact. Nose: Nares normal. No drainage or sinus tenderness. Throat: Lips, mucosa, and tongue normal.    Neck: Supple, symmetrical, trachea midline, no adenopathy. Lungs:   Clear to auscultation bilaterally. Heart:  Regular rate and rhythm, S1, S2 normal, no murmur, click, rub or gallop. Abdomen: Soft, non-tender. Bowel sounds normal. No masses,  No organomegaly. Extremities: Right thumb is wrapped in a bandage after I&D procedure. Erythema extending up arm with streaking extending all of the way to her hand   Pulses: 2+ and symmetric all extremities. Skin: Skin color pink, turgor normal. No rashes or lesions   Neurologic: CNII-XII intact. No focal motor or sensory deficit. Labs Reviewed:    Recent Results (from the past 24 hour(s))   CBC WITH AUTOMATED DIFF    Collection Time: 12/06/19  4:20 PM   Result Value Ref Range    WBC 10.8 4.6 - 13.2 K/uL    RBC 3.95 (L) 4.20 - 5.30 M/uL    HGB 9.8 (L) 12.0 - 16.0 g/dL    HCT 31.4 (L) 35.0 - 45.0 %    MCV 79.5 74.0 - 97.0 FL    MCH 24.8 24.0 - 34.0 PG    MCHC 31.2 31.0 - 37.0 g/dL    RDW 14.9 (H) 11.6 - 14.5 %    PLATELET 372 121 - 280 K/uL    MPV 9.5 9.2 - 11.8 FL    NEUTROPHILS 74 (H) 40 - 73 %    LYMPHOCYTES 19 (L) 21 - 52 %    MONOCYTES 6 3 - 10 %    EOSINOPHILS 1 0 - 5 %    BASOPHILS 0 0 - 2 %    ABS. NEUTROPHILS 8.1 (H) 1.8 - 8.0 K/UL    ABS. LYMPHOCYTES 2.0 0.9 - 3.6 K/UL    ABS. MONOCYTES 0.6 0.05 - 1.2 K/UL    ABS. EOSINOPHILS 0.1 0.0 - 0.4 K/UL    ABS. BASOPHILS 0.0 0.0 - 0.1 K/UL    DF AUTOMATED     METABOLIC PANEL, COMPREHENSIVE    Collection Time: 12/06/19  4:20 PM   Result Value Ref Range    Sodium 135 (L) 136 - 145 mmol/L    Potassium 3.8 3.5 - 5.5 mmol/L    Chloride 102 100 - 111 mmol/L    CO2 27 21 - 32 mmol/L    Anion gap 6 3.0 - 18 mmol/L    Glucose 99 74 - 99 mg/dL    BUN 9 7.0 - 18 MG/DL    Creatinine 0.73 0.6 - 1.3 MG/DL    BUN/Creatinine ratio 12 12 - 20      GFR est AA >60 >60 ml/min/1.73m2    GFR est non-AA >60 >60 ml/min/1.73m2    Calcium 9.0 8.5 - 10.1 MG/DL    Bilirubin, total 0.5 0.2 - 1.0 MG/DL    ALT (SGPT) 77 (H) 13 - 56 U/L    AST (SGOT) 58 (H) 10 - 38 U/L    Alk.  phosphatase 202 (H) 45 - 117 U/L    Protein, total 7.0 6.4 - 8.2 g/dL    Albumin 3.3 (L) 3.4 - 5.0 g/dL    Globulin 3.7 2.0 - 4.0 g/dL    A-G Ratio 0.9 0.8 - 1.7         Procedures/imaging: see electronic medical records for all procedures/Xrays and details which were not copied into this note but were reviewed prior to creation of Plan        CC: None

## 2019-12-07 NOTE — ANESTHESIA PREPROCEDURE EVALUATION
Relevant Problems   No relevant active problems       Anesthetic History   No history of anesthetic complications            Review of Systems / Medical History  Patient summary reviewed, nursing notes reviewed and pertinent labs reviewed    Pulmonary          Smoker (\"few cigarettes a day\" - nonce since she has been hospitalized)         Neuro/Psych   Within defined limits           Cardiovascular  Within defined limits                Exercise tolerance: >4 METS     GI/Hepatic/Renal  Within defined limits              Endo/Other  Within defined limits           Other Findings   Comments: History of drug use - has been on suboxone but has not had in 3 days           Physical Exam    Airway  Mallampati: II  TM Distance: 4 - 6 cm  Neck ROM: normal range of motion   Mouth opening: Normal     Cardiovascular               Dental  No notable dental hx       Pulmonary                 Abdominal         Other Findings            Anesthetic Plan    ASA: 2, emergent  Anesthesia type: general            Anesthetic plan and risks discussed with: Patient      IV GA with brief loss of consciousness for local by Dr. Donald Guo and then sedation for case. Will attempt to avoid narcotics as local infiltration should make this pain free. Negative HCG in chart.

## 2019-12-07 NOTE — PROGRESS NOTES
Skin assessment completed with Tommy Zacarias RN. Skin:     Scabs on face, arms and legs    Sacrum intact no s/s of pressure wounds    Tattoo on R forearm    Healed old scar on R forearm    20g infusing in R forearm with no issues. Will continue to monitor.

## 2019-12-07 NOTE — PROGRESS NOTES
Pt arrived to floor via stretcher on IV vancomycin from ED staff, VSS, pt c/o pain in R hand where wound is located. Pain meds to be given, A&Ox3, bed in lowest position, call light within reach, pt resting watching t.v. Pt states that she is hungry will attempt to get tray or alternative. Whiteboard updated, questions answered, will continue to monitor.

## 2019-12-07 NOTE — OP NOTES
Surgeon: Deirdre Langford MD     Preop diagnosis:  Right thumb felon associated with cellulitis, question flexor tenosynovitis    Postop diagnosis:  Right thumb felon associated with cellulitis and vascular insufficiency with developing thenar abscess    Anesthesia: MAC    Anesthesiologist: Dr. Alice Spivey    EBL: minimal    Findings:  open ulceration ulnar volar pulp with ulnar mid lateral incision and vascular compromise of volar pulp. Developing purulence within the thenar cone. No evidence of purulence within the flexor tendon sheath    Indications:  71-year-old female who underwent incision and drainage of felon in the emergency department yesterday evening after presenting with open ulceration in purulence drainage with developing vascular compromise to the thumb. Risk and benefits of repeat incision and drainage with exploration were discussed and informed consent was obtained. Description: a the patient was identified in the pre anesthesia area in the right thumb marked as the operative site. The patient is on IV antibiotics in the hospital.  She was brought to the operating room in supine position and transferred to the operating room table. After the establishment of  Sedation operative time-out was taken peripheral nerve blockade was established under sterile technique to the right thumb. Well-padded tourniquet applied right upper extremity. Prepped and draped in usual sterile fashion Hibiclens alcohol. Operative time-out taken. Arm elevated approximately exsanguinated. Proposed incision erinn on the proximal digital crease. Under loupe magnification the skin was incised and soft tissue dissection carried down identifying the neurovascular bundles which were protected. The annular pulley 1 was released longitudinally in entirety preserving the oblique pulley. No purulence within the annular pulley system was noted.   Soft tissue dissection was carried proximally into the volar aspect of the thenar cone with purulence fluid obtained. Decompression was performed through this area for developing abscess. Attention was directed to the prior ulnar mid lateral incision which was explored. There is no purulence but necrotic tissue noted. This was debrided. Neurovascular bundles were protected. The open ulceration on the volar ulnar pulp was debrided as well. Devitalized epidermis was excised and debrided. Soft tissue dissection carried dorsally with no  Notable purulence. For prolonged cleansing was performed of the open wounds with antibiotic containing solution. A Penrose was placed exiting the palmar incision at the proximal digital crease which was loosely approximated with 4 nylon suture. Additional Penrose was placed exiting ulnar mid lateral incision. Xeroform dressings were applied with the tourniquet deflated. The digit demonstrated persistent diminished perfusion of the volar pulp. As well the hyponychium demonstrated   Diminished perfusion. Disposition. The patient is admitted back to the hospital.  She is on IV antibiotics pending culture results. Given soft tissue infection anticipate MRSA on culture given history with discharge on Bactrim and doxycycline orally for prolonged course given marginal soft tissue envelope. Again possibility of amputation was discussed with the patient given the degree of soft tissue compromise due to the delay in presentation for the development of infection.

## 2019-12-07 NOTE — PROGRESS NOTES
Spoke with MD in regards to patient c/o itching and requesting something PRN, per Dr. Pichardo Killer patient may have Benadryl 12.5mg PRN Q4hrs. Order to be placed.

## 2019-12-07 NOTE — ED NOTES
TRANSFER - OUT REPORT:    Verbal report given to [facility] RN on Rojelio Oppenheim  being transferred to Med/Surg(unit) for routine progression of care       Report consisted of patients Situation, Background, Assessment and   Recommendations(SBAR). Information from the following report(s) ED Summary and Recent Results was reviewed with the receiving nurse. Lines:   Peripheral IV 12/06/19 Right Forearm (Active)        Opportunity for questions and clarification was provided.       Patient transported with:   [employer]

## 2019-12-07 NOTE — ED NOTES
Paged Dr. Veronica Martinez to notify him that the anaerobic wound culture was obtained on the incorrect swab. This was communicated in report.

## 2019-12-08 VITALS
TEMPERATURE: 97.9 F | WEIGHT: 137.5 LBS | OXYGEN SATURATION: 100 % | HEART RATE: 65 BPM | SYSTOLIC BLOOD PRESSURE: 130 MMHG | HEIGHT: 65 IN | BODY MASS INDEX: 22.91 KG/M2 | DIASTOLIC BLOOD PRESSURE: 87 MMHG | RESPIRATION RATE: 16 BRPM

## 2019-12-08 PROBLEM — L03.011: Status: ACTIVE | Noted: 2019-12-08

## 2019-12-08 LAB
BACTERIA SPEC CULT: ABNORMAL
GRAM STN SPEC: ABNORMAL
SERVICE CMNT-IMP: ABNORMAL
SERVICE CMNT-IMP: ABNORMAL

## 2019-12-08 PROCEDURE — 74011250636 HC RX REV CODE- 250/636: Performed by: ORTHOPAEDIC SURGERY

## 2019-12-08 PROCEDURE — 74011250637 HC RX REV CODE- 250/637: Performed by: ORTHOPAEDIC SURGERY

## 2019-12-08 PROCEDURE — 99218 HC RM OBSERVATION: CPT

## 2019-12-08 PROCEDURE — 74011250636 HC RX REV CODE- 250/636: Performed by: FAMILY MEDICINE

## 2019-12-08 RX ORDER — ACETAMINOPHEN 500 MG
1000 TABLET ORAL
Qty: 60 TAB | Refills: 0 | Status: SHIPPED | OUTPATIENT
Start: 2019-12-08

## 2019-12-08 RX ORDER — SULFAMETHOXAZOLE AND TRIMETHOPRIM 800; 160 MG/1; MG/1
1 TABLET ORAL 2 TIMES DAILY
Qty: 28 TAB | Refills: 0 | Status: ON HOLD | OUTPATIENT
Start: 2019-12-08 | End: 2022-06-04

## 2019-12-08 RX ORDER — DOXYCYCLINE 100 MG/1
100 TABLET ORAL 2 TIMES DAILY
Qty: 28 TAB | Refills: 0 | Status: ON HOLD | OUTPATIENT
Start: 2019-12-08 | End: 2022-06-04

## 2019-12-08 RX ORDER — KETOROLAC TROMETHAMINE 10 MG/1
10 TABLET, FILM COATED ORAL EVERY 6 HOURS
Qty: 12 TAB | Refills: 0 | Status: ON HOLD | OUTPATIENT
Start: 2019-12-08 | End: 2022-06-04

## 2019-12-08 RX ORDER — OXYCODONE HYDROCHLORIDE 5 MG/1
5-10 TABLET ORAL
Qty: 15 TAB | Refills: 0 | Status: SHIPPED | OUTPATIENT
Start: 2019-12-08 | End: 2019-12-13

## 2019-12-08 RX ADMIN — OXYCODONE HYDROCHLORIDE 10 MG: 5 TABLET ORAL at 02:28

## 2019-12-08 RX ADMIN — HYDROMORPHONE HYDROCHLORIDE 0.5 MG: 1 INJECTION, SOLUTION INTRAMUSCULAR; INTRAVENOUS; SUBCUTANEOUS at 00:06

## 2019-12-08 RX ADMIN — ACETAMINOPHEN 1000 MG: 500 TABLET ORAL at 10:09

## 2019-12-08 RX ADMIN — OXYCODONE HYDROCHLORIDE 10 MG: 5 TABLET ORAL at 14:18

## 2019-12-08 RX ADMIN — OXYCODONE HYDROCHLORIDE 10 MG: 5 TABLET ORAL at 10:08

## 2019-12-08 RX ADMIN — VANCOMYCIN HYDROCHLORIDE 1250 MG: 1.25 INJECTION, POWDER, LYOPHILIZED, FOR SOLUTION INTRAVENOUS at 06:27

## 2019-12-08 RX ADMIN — OXYCODONE HYDROCHLORIDE 10 MG: 5 TABLET ORAL at 06:27

## 2019-12-08 RX ADMIN — ACETAMINOPHEN 1000 MG: 500 TABLET ORAL at 00:06

## 2019-12-08 NOTE — PROGRESS NOTES
Problem: Falls - Risk of  Goal: *Absence of Falls  Description  Document [facility] [place] Fall Risk and appropriate interventions in the flowsheet.   Outcome: Progressing Towards Goal  Note: Fall Risk Interventions:            Medication Interventions: Teach patient to arise slowly, Patient to call before getting OOB

## 2019-12-08 NOTE — ROUTINE PROCESS
Dual AVS reviewed with Larisa Canales RN. All medications reviewed individually with patient. Opportunities for questions and concerns provided. Patient verbalized understanding and verified by teachback. IV discontinued, no redness, swelling or pain noted. Patient awaiting grandparents for transportation home, with an ETA of 20 min. Patient discharged via (mode of transport ie. Car, ambulance or air transport) car. Patient's arm band appropriately discarded.

## 2019-12-08 NOTE — DISCHARGE SUMMARY
Discharge Summary    Patient: Vu Gooden MRN: [id number]  CSN: [id number]    YOB: 1995  Age: 25 y.o. Sex: female    DOA: 12/6/2019 LOS:  LOS: 2 days   Discharge Date:      Primary Care Provider:  None    Admission Diagnoses: Infection of thumb [L08.9]  Infection of fingernail of right hand [L03.011]    Discharge Diagnoses:    Problem List as of 12/8/2019 Date Reviewed: 12/7/2019          Codes Class Noted - Resolved    Infection of fingernail of right hand ICD-10-CM: L03.011  ICD-9-CM: 681.02  12/8/2019 - Present        Infection of thumb ICD-10-CM: L08.9  ICD-9-CM: 686.9  12/6/2019 - Present        Vaginitis and vulvovaginitis ICD-10-CM: N76.0  ICD-9-CM: 616.10  7/28/2019 - Present        Right ankle swelling ICD-10-CM: M25.471  ICD-9-CM: 719.07  7/27/2019 - Present        Positive blood culture ICD-10-CM: R78.81  ICD-9-CM: 790.7  7/27/2019 - Present        Cellulitis ICD-10-CM: L03.90  ICD-9-CM: 682.9  7/25/2019 - Present        Sepsis ([address] ICD-10-CM: A41.9  ICD-9-CM: 038.9, 995.91  7/25/2019 - Present              Discharge Medications:     Current Discharge Medication List      START taking these medications    Details   oxyCODONE IR (ROXICODONE) 5 mg immediate release tablet Take 1-2 Tabs by mouth every six (6) hours as needed for Pain for up to 5 days. Max Daily Amount: 40 mg.  Qty: 15 Tab, Refills: 0    Associated Diagnoses: Felon with lymphangitis, right      ketorolac (TORADOL) 10 mg tablet Take 1 Tab by mouth every six (6) hours. Qty: 12 Tab, Refills: 0      acetaminophen (TYLENOL) 500 mg tablet Take 2 Tabs by mouth every eight (8) hours as needed for Pain. Qty: 60 Tab, Refills: 0      doxycycline (ADOXA) 100 mg tablet Take 1 Tab by mouth two (2) times a day. Qty: 28 Tab, Refills: 0      trimethoprim-sulfamethoxazole (BACTRIM DS) 160-800 mg per tablet Take 1 Tab by mouth two (2) times a day.   Qty: 28 Tab, Refills: 0         STOP taking these medications amoxicillin-clavulanate (AUGMENTIN) 875-125 mg per tablet Comments:   Reason for Stopping:               Discharge Condition: improved    Procedures : Right Thumb Abscess I&D    Consults: Dr. Alyce Vega, Orthopedic Surgery       PHYSICAL EXAM   Visit Vitals  /87   Pulse 65   Temp 97.9 °F (36.6 °C)   Resp 16   Ht 5' 5\" (1.651 m)   Wt 62.4 kg (137 lb 8 oz)   SpO2 100%   Breastfeeding No   BMI 22.88 kg/m²     General: Awake, cooperative, no acute distress    HEENT: NC, Atraumatic. PERRLA, EOMI. Anicteric sclerae. Lungs:  CTA Bilaterally. No Wheezing/Rhonchi/Rales. Heart:  Regular  rhythm,  No murmur, No Rubs, No Gallops  Abdomen: Soft, Non distended, Non tender. +Bowel sounds,   Extremities: No c/c/e  Psych:   Not anxious or agitated. Neurologic:  No acute neurological deficits. Admission HPI : Mabel Saha is a 25 y.o. female with PMHX of IVDA with recent hospitalization at [facility] presents to the emergency department C/O right thumb pain and swelling onset 1 week ago.  Patient states her boyfriend had an abscess but she tried to pop over a week ago followed by redness swelling to her right thumb.  She tried to pop it herself and noted some bloody purulent drainage.  Patient denies any current IV drug abuse, currently on Suboxone and occasionally takes gabapentin. Pt denies injury, fever, and any other sxs or complaints. Hospital Course :     24 y/o F with h/o IVDU.  Admitted for right thumb infection with ulceration and soft tissue compromise, possible early flexor tenosynovitis with associated cellulitis.      POD #1 for I&D Right Thumb Abscess   Wound culture positive for MRSA --sensitivity to Clindamycin and Bactrim-DS   Select Bactrim DS 1 tab po bid x10 days      Dr. Syd Arguelles consulted for hand orthopedic surgery --performed I&D yesterday, can DC home from surgical perspective      Will DC home on po antibiotics and oxycodone for pain control     Activity: ad david    Diet: regular     Follow-up: with Dr. Yanelis Gomes in 7-10 days     Disposition: Home     Minutes spent on discharge: 35 minutes       Labs: Results:       Chemistry Recent Labs     12/07/19 0420 12/06/19  1620   GLU 91 99    135*   K 4.1 3.8    102   CO2 27 27   BUN 11 9   CREA 0.78 0.73   CA 8.3* 9.0   AGAP 5 6   BUCR 14 12   AP  --  202*   TP  --  7.0   ALB  --  3.3*   GLOB  --  3.7   AGRAT  --  0.9      CBC w/Diff Recent Labs     12/07/19 0420 12/06/19  1620   WBC 8.0 10.8   RBC 3.75* 3.95*   HGB 9.4* 9.8*   HCT 30.1* 31.4*    288   GRANS  --  74*   LYMPH  --  19*   EOS  --  1      Cardiac Enzymes No results for input(s): CPK, CKND1, VANI in the last 72 hours. No lab exists for component: CKRMB, TROIP   Coagulation Recent Labs     12/07/19 0420   PTP 14.4   INR 1.1       Lipid Panel No results found for: CHOL, CHOLPOCT, CHOLX, CHLST, CHOLV, 017411, HDL, HDLP, LDL, LDLC, DLDLP, 962591, VLDLC, VLDL, TGLX, TRIGL, TRIGP, TGLPOCT, CHHD, CHHDX   BNP No results for input(s): BNPP in the last 72 hours. Liver Enzymes Recent Labs     12/06/19  1620   TP 7.0   ALB 3.3*   *   SGOT 58*      Thyroid Studies No results found for: T4, T3U, TSH, TSHEXT         Significant Diagnostic Studies: Xr Hand Rt Min 3 V    Result Date: 12/6/2019  EXAM: Right hand series, 3 views INDICATION: Cellulitis and swelling, draining of right thumb. COMPARISON: None. _______________ FINDINGS: No fracture or joint deformity. No erosions or periostitis. Diffuse soft tissue swelling of the thumb is noted. No soft tissue gas. _______________     IMPRESSION: Diffuse soft tissue swelling of the thumb without soft tissue gas. No osseous signs of osteomyelitis. No results found for this or any previous visit.         CC: None

## 2019-12-08 NOTE — PROGRESS NOTES
1930 - Bedside report received from Danielle Franks RN. Patient in bed watching TV at this time. Pain 7/10. Plan of care for the day addressed with the patient. Will continue to monitor. 2000  - Patient in bed at this time. A/O x 4. IV to L hand  intact and patent. SCDs to BLE. Gauze, 4x4 dressing to R thumb drainage, weeping. Pt denies numbness/tingling. Pulses positive, palpable. Lungs clear. Bowel sounds active to all quadrants. Pain 8/10. Call bell, phone, and personal items w/ in pt reach. Pt has no requests or concerns at this time. Will continue to monitor. 2011 PRN Dilaudid 0.5 mg IV requested by pt for pain rated 8/10. Will continue to monitor. 2227 PRN Jovita 10 mg administered by pt request for pain rated 7/10. No s/s of distress noted. Pt in bed watching TV. Will continue to monitor. 0006 PRN Dilaudid 0.5 mg IV administered by pt request for pain rated 7/10. Pt elevating affected hand/thumb on pillows. Declines ice. Provided pt w/ soft drink. Pt in bed watching TV. No s/s of distress noted. Will continue to monitor. 0228 PRN Jovita 10 mg administered by pt request for pain. Will continue to monitor. 0321 Lab on unit. Pt refusing lab draw. Informed pt lab draw was needed so she would receive the best care. Pt stated \" I don't care, I just went to sleep\". 9949 PRN Jovita 10 mg administered by pt request for pain. Will continue to monitor. Bedside and Verbal shift change report given to Danielle Franks RN by Andre Shannon RN. Report included the following information SBAR, Kardex, OR Summary, Intake/Output and MAR.

## 2019-12-08 NOTE — PROGRESS NOTES
Patient Vitals for the past 24 hrs:   Temp Pulse Resp BP SpO2   12/08/19 0412 98.9 °F (37.2 °C) 68 15 107/64 99 %   12/07/19 2242 98 °F (36.7 °C) 80 16 122/66 100 %   12/07/19 1830 98.6 °F (37 °C) 90 18 120/64 100 %   12/07/19 1730 98.5 °F (36.9 °C) (!) 105 18 113/63 99 %   12/07/19 1630 98 °F (36.7 °C) 83 16 135/81 98 %   12/07/19 1554 97.9 °F (36.6 °C) 92 18 131/79 100 %   12/07/19 1508 -- 92 20 110/49 100 %   12/07/19 1138 98.3 °F (36.8 °C) 86 16 116/55 98 %     MRSA felon with developing soft tissue abscess. Commence wound care  rom as tolerated  Transition to po  Stable from hand surgical perspective for d/c. Right thumb  Penrose removed. Viable. Residual swelling. No evidence of deep space infection. Flexor tendons soft compressible. No epitrochlear axillary nodes. Results     Procedure Component Value Units Date/Time    CULTURE, [place] STAIN [[id number]]  (Abnormal)  (Susceptibility) Collected:  12/06/19 1820    Order Status:  Completed Specimen:  Wound Drainage Updated:  12/08/19 1026     Special Requests: NO SPECIAL REQUESTS        GRAM STAIN RARE WBC'S               RARE GRAM POSITIVE COCCI IN PAIRS IN GROUPS           Culture result:       MODERATE * METHICILLIN RESISTANT STAPHYLOCOCCUS AUREUS *                  CALLED TO AND CORRECTLY REPEATED BY:  RAFFAELE CARLSON RN 3S TO DBL AT 0935 ON 12/8/19.       Susceptibility      Staphylococcus aureus Methcillin Resistant     PHILIP     Ampicillin/sulbactam ($) Resistant     Cefazolin ($) Resistant     Clindamycin ($) Susceptible     Erythromycin ($$$$) Resistant     Gentamicin ($) Susceptible     Levofloxacin ($) Intermediate     Oxacillin Resistant     Penicillin G ($$) Resistant     Rifampin ($$$$) Susceptible     Tetracycline Susceptible     Tigecycline ($$$$) Susceptible     Trimeth-Sulfamethoxa Susceptible     Vancomycin ($) Susceptible                    CULTURE, Champ Vázquez [[id number]] Collected:  12/06/19 1815    Order Status: Canceled Specimen:  Wound Drainage     CULTURE, BLOOD [[id number]] Collected:  12/06/19 1637    Order Status:  Completed Specimen:  Blood Updated:  12/08/19 0710     Special Requests: NO SPECIAL REQUESTS        Culture result: NO GROWTH 2 DAYS       CULTURE, HSV W/ TYPING [[id number]] Collected:  12/06/19 1637    Order Status:  Completed Specimen:  Vesicular Fluid Updated:  12/06/19 1652    CULTURE, WOUND Rolena Chace STAIN [[id number]]  (Abnormal)  (Susceptibility) Collected:  12/06/19 1635    Order Status:  Completed Specimen:  Wound from Abscess Updated:  12/08/19 1025     Special Requests: NO SPECIAL REQUESTS        GRAM STAIN NO ORGANISMS SEEN         NO WBC'S SEEN        Culture result:       MODERATE * METHICILLIN RESISTANT STAPHYLOCOCCUS AUREUS *                  CALLED TO AND CORRECTLY REPEATED BY:  RAFFAELE CARLSON RN 3S TO DBL AT 0935 ON 12/8/19.       Susceptibility      Staphylococcus aureus Methcillin Resistant     PHILIP     Ampicillin/sulbactam ($) Resistant     Cefazolin ($) Resistant     Clindamycin ($) Susceptible     Erythromycin ($$$$) Resistant     Gentamicin ($) Susceptible     Levofloxacin ($) Intermediate     Oxacillin Resistant     Penicillin G ($$) Resistant     Rifampin ($$$$) Susceptible     Tetracycline Susceptible     Tigecycline ($$$$) Susceptible     Trimeth-Sulfamethoxa Susceptible     Vancomycin ($) Susceptible                    CULTURE, BLOOD [[id number]] Collected:  12/06/19 1620    Order Status:  Completed Specimen:  Blood Updated:  12/08/19 0710     Special Requests: NO SPECIAL REQUESTS        Culture result: NO GROWTH 2 DAYS

## 2019-12-08 NOTE — PROGRESS NOTES
Hospitalist Progress Note    Patient: Bronwyn Wynn MRN: [id number]  CSN: [id number]    YOB: 1995  Age: 25 y.o. Sex: female    DOA: 12/6/2019 LOS:  LOS: 2 days          Chief Complaint:  Right finger infection     Assessment/Plan   24 y/o F with h/o IVDU. Admitted for right thumb infection with ulceration and soft tissue compromise, possible early flexor tenosynovitis with associated cellulitis. S/p I&D in ED by DR. Ortega    POD #1 for I&D Right Thumb Abscess   Wound culture positive for MRSA --sensitivity to Clindamycin and Bactrim-DS   Select Bactrim DS 1 tab po bid x10 days      Dr. Pepper consulting for hand orthopedic surgery --performed I&D yesterday, can DC home from surgical perspective     Will DC home on po antibiotics and oxycodone for pain control   Patient Active Problem List   Diagnosis Code    Cellulitis L03.90    Sepsis ([facility] [address] A41.9    Right ankle swelling M25.471    Positive blood culture R78.81    Vaginitis and vulvovaginitis N76.0    Infection of thumb L08.9    Infection of fingernail of right hand L03.011       Subjective:  Pt in good spirits and ready to go home    Review of systems:    Constitutional: denies fevers, chills, myalgias  Respiratory: denies SOB, cough  Cardiovascular: denies chest pain, palpitations  Gastrointestinal: denies nausea, vomiting, diarrhea      Vital signs/Intake and Output:  Visit Vitals  /64   Pulse 68   Temp 98.9 °F (37.2 °C)   Resp 15   Ht 5' 5\" (1.651 m)   Wt 62.4 kg (137 lb 8 oz)   SpO2 99%   Breastfeeding No   BMI 22.88 kg/m²     Current Shift:  No intake/output data recorded. Last three shifts:  12/06 1901 - 12/08 0700  In: 690 [P.O.:240;  I.V.:450]  Out: 250 [Urine:250]    Exam:    General: Well developed, alert, NAD, OX3  Head/Neck: NCAT, supple, No masses, No lymphadenopathy  CVS:Regular rate and rhythm, no M/R/G, S1/S2 heard, no thrill  Lungs:Clear to auscultation bilaterally, no wheezes, rhonchi, or rales  Abdomen: Soft, Nontender, No distention, Normal Bowel sounds, No hepatomegaly  Extremities: No C/C/E, pulses palpable 2+  Skin: RIGHT thumb wrapped in bandage   Neuro:grossly normal , follows commands  Psych:appropriate                Labs: Results:       Chemistry Recent Labs     12/07/19 0420 12/06/19  1620   GLU 91 99    135*   K 4.1 3.8    102   CO2 27 27   BUN 11 9   CREA 0.78 0.73   CA 8.3* 9.0   AGAP 5 6   BUCR 14 12   AP  --  202*   TP  --  7.0   ALB  --  3.3*   GLOB  --  3.7   AGRAT  --  0.9      CBC w/Diff Recent Labs     12/07/19  0420 12/06/19  1620   WBC 8.0 10.8   RBC 3.75* 3.95*   HGB 9.4* 9.8*   HCT 30.1* 31.4*    288   GRANS  --  74*   LYMPH  --  19*   EOS  --  1      Cardiac Enzymes No results for input(s): CPK, CKND1, VANI in the last 72 hours. No lab exists for component: CKRMB, TROIP   Coagulation Recent Labs     12/07/19  0420   PTP 14.4   INR 1.1       Lipid Panel No results found for: CHOL, CHOLPOCT, CHOLX, CHLST, CHOLV, 166553, HDL, HDLP, LDL, LDLC, DLDLP, 680506, VLDLC, VLDL, TGLX, TRIGL, TRIGP, TGLPOCT, CHHD, CHHDX   BNP No results for input(s): BNPP in the last 72 hours.    Liver Enzymes Recent Labs     12/06/19  1620   TP 7.0   ALB 3.3*   *   SGOT 58*      Thyroid Studies No results found for: T4, T3U, TSH, TSHEXT     Procedures/imaging: see electronic medical records for all procedures/Xrays and details which were not copied into this note but were reviewed prior to creation of Levy Skiff, MD

## 2019-12-08 NOTE — DISCHARGE INSTRUCTIONS
Wound care. .. soak thumb in antibacterial solution (dial soap/chlorhexidine plus warm water) twice per day for 3 to 5 minutes. ... dry to avoid maceration. Cover with non adherent gauze. Range of motion as tolerated. Patient Education        Cellulitis: Care Instructions  Your Care Instructions    Cellulitis is a skin infection caused by bacteria, most often strep or staph. It often occurs after a break in the skin from a scrape, cut, bite, or puncture, or after a rash. Cellulitis may be treated without doing tests to find out what caused it. But your doctor may do tests, if needed, to look for a specific bacteria, like methicillin-resistant Staphylococcus aureus (MRSA). The doctor has checked you carefully, but problems can develop later. If you notice any problems or new symptoms, get medical treatment right away. Follow-up care is a key part of your treatment and safety. Be sure to make and go to all appointments, and call your doctor if you are having problems. It's also a good idea to know your test results and keep a list of the medicines you take. How can you care for yourself at home? · Take your antibiotics as directed. Do not stop taking them just because you feel better. You need to take the full course of antibiotics. · Prop up the infected area on pillows to reduce pain and swelling. Try to keep the area above the level of your heart as often as you can. · If your doctor told you how to care for your wound, follow your doctor's instructions. If you did not get instructions, follow this general advice:  ? Wash the wound with clean water 2 times a day. Don't use hydrogen peroxide or alcohol, which can slow healing. ? You may cover the wound with a thin layer of petroleum jelly, such as Vaseline, and a nonstick bandage. ? Apply more petroleum jelly and replace the bandage as needed. · Be safe with medicines. Take pain medicines exactly as directed.   ? If the doctor gave you a prescription medicine for pain, take it as prescribed. ? If you are not taking a prescription pain medicine, ask your doctor if you can take an over-the-counter medicine. To prevent cellulitis in the future  · Try to prevent cuts, scrapes, or other injuries to your skin. Cellulitis most often occurs where there is a break in the skin. · If you get a scrape, cut, mild burn, or bite, wash the wound with clean water as soon as you can to help avoid infection. Don't use hydrogen peroxide or alcohol, which can slow healing. · If you have swelling in your legs (edema), support stockings and good skin care may help prevent leg sores and cellulitis. · Take care of your feet, especially if you have diabetes or other conditions that increase the risk of infection. Wear shoes and socks. Do not go barefoot. If you have athlete's foot or other skin problems on your feet, talk to your doctor about how to treat them. When should you call for help? Call your doctor now or seek immediate medical care if:    · You have signs that your infection is getting worse, such as:  ? Increased pain, swelling, warmth, or redness. ? Red streaks leading from the area. ? Pus draining from the area. ? A fever.     · You get a rash.    Watch closely for changes in your health, and be sure to contact your doctor if:    · You do not get better as expected. Where can you learn more? Go to http://brian-mariah.info/. Charbel Look in the search box to learn more about \"Cellulitis: Care Instructions. \"  Current as of: April 1, 2019  Content Version: 12.2  © 9486-8553 [employer]. Care instructions adapted under license by [employer] (which disclaims liability or warranty for this information). If you have questions about a medical condition or this instruction, always ask your healthcare professional. [address] any warranty or liability for your use of this information.        Patient Education        MRSA: Care Instructions  Your Care Instructions    MRSA stands for methicillin-resistant Staphylococcus aureus. It is a type of bacteria that can cause a staph infection. But it cannot be killed by the antibiotic methicillin and some other antibiotics. This sometimes makes it harder to treat. The bacteria are widespread on skin and in the nose. MRSA can cause infections of the skin, heart, blood, and bones. The bacteria can spread quickly in the body and cause serious problems. MRSA can also be spread from person to person. Depending on how serious your infection is, the doctor may drain your wound and you may get antibiotics through a small tube placed in a vein (IV). Your doctor may also give you an antibiotic ointment to use on sores or in your nose. Follow-up care is a key part of your treatment and safety. Be sure to make and go to all appointments, and call your doctor if you are having problems. It's also a good idea to know your test results and keep a list of the medicines you take. How can you care for yourself at home? · Take your antibiotics as directed. Do not stop taking them just because you feel better. You need to take the full course of antibiotics. · Keep any cuts or other wounds covered while they heal.  · Wash your hands often, especially after you touch elastic bandages or other dressings over a wound. This can keep the bacteria from spreading. Wrap bandages in a plastic bag before you throw them away. · Do not share towels, washcloths, razors, clothing, or other items that touched your wound or bandage. Wash your sheets, towels, and clothes with warm water and detergent. Dry them in a hot dryer, if possible. · Keep shared areas clean by wiping down surfaces (such as countertops, doorknobs, and light switches) with a disinfectant. When should you call for help?   Call your doctor now or seek immediate medical care if:    · You have worse symptoms of infection, such as:  ? Increased pain, swelling, warmth, or redness. ? Red streaks leading from the area. ? Pus draining from the area. ? A fever.    Watch closely for changes in your health, and be sure to contact your doctor if:    · You do not get better as expected. Where can you learn more? Go to http://brian-mariah.info/. Enter L729 in the search box to learn more about \"MRSA: Care Instructions. \"  Current as of: June 9, 2019  Content Version: 12.2  © 8863-7887 [employer]. Care instructions adapted under license by [employer] (which disclaims liability or warranty for this information). If you have questions about a medical condition or this instruction, always ask your healthcare professional. [address] any warranty or liability for your use of this information.

## 2019-12-08 NOTE — PROGRESS NOTES
Problem: Falls - Risk of  Goal: *Absence of Falls  Description  Document Briana Segura Fall Risk and appropriate interventions in the flowsheet.   Outcome: Progressing Towards Goal  Note: Fall Risk Interventions:            Medication Interventions: Teach patient to arise slowly                   Problem: Patient Education: Go to Patient Education Activity  Goal: Patient/Family Education  Outcome: Progressing Towards Goal

## 2019-12-08 NOTE — PROGRESS NOTES
Problem: Falls - Risk of  Goal: *Absence of Falls  Description  Document Miki Christensen Fall Risk and appropriate interventions in the flowsheet. Outcome: Resolved/Met  Note: Fall Risk Interventions:            Medication Interventions: Teach patient to arise slowly                   Problem: Patient Education: Go to Patient Education Activity  Goal: Patient/Family Education  Outcome: Resolved/Met     Problem: Pain  Goal: *Control of Pain  Outcome: Resolved/Met     Problem: Cellulitis Care Plan (Adult)  Goal: *Control of acute pain  Outcome: Resolved/Met  Goal: *Skin integrity maintained  Outcome: Resolved/Met  Goal: *Absence of infection signs and symptoms  Outcome: Resolved/Met     Problem: Patient Education: Go to Patient Education Activity  Goal: Patient/Family Education  Outcome: Resolved/Met     Problem: Risk for Spread of Infection  Goal: Prevent transmission of infectious organism to others  Description  Prevent the transmission of infectious organisms to other patients, staff members, and visitors.   Outcome: Resolved/Met     Problem: Patient Education:  Go to Education Activity  Goal: Patient/Family Education  Outcome: Resolved/Met

## 2019-12-08 NOTE — ROUTINE PROCESS
Bedside and Verbal shift change report given to STEFANY Mendez (oncoming nurse) by LILIA Mcintyre RN (offgoing nurse). Report included the following information SBAR, Kardex, OR Summary, Intake/Output, MAR and Recent Results.

## 2019-12-08 NOTE — PROGRESS NOTES
5316 - Bedside shift report received from STEFANY Mendez. Assumed care of patient. Patient noted resting in bed with eyes closed at this time. Call light in reach. 9586 - IV pulled out. Cath tip intact. No bleeding noted at this time. No reports of pain/discomfort at this time. Patient noted resting in bed with call light in reach. 1009 - Assessment completed as per flowsheet. Denies any SOB/chest pain. Bulky gauze dressing noted to right thumb CDI. Right thumb continus with swelling. Patient encouraged to elevate. Reports pain 9/10 to right thumb. PRN oxycodone administered. Patient resting quietly in bed with call light in reach. 1418 - PRN oxycodone administered for pain relief during transport home.

## 2019-12-09 LAB
HSV SPEC CULT: NORMAL
SPECIMEN SOURCE: NORMAL

## 2022-01-31 LAB
CHLAMYDIA, EXTERNAL: NEGATIVE
HBSAG, EXTERNAL: NEGATIVE
HIV, EXTERNAL: NEGATIVE
N. GONORRHEA, EXTERNAL: NEGATIVE
RPR, EXTERNAL: NON REACTIVE
RUBELLA, EXTERNAL: NORMAL

## 2022-06-04 ENCOUNTER — HOSPITAL ENCOUNTER (EMERGENCY)
Age: 27
Discharge: HOME | End: 2022-06-04
Attending: OBSTETRICS & GYNECOLOGY | Admitting: OBSTETRICS & GYNECOLOGY
Payer: MEDICAID

## 2022-06-04 LAB
APPEARANCE UR: CLEAR
BILIRUB UR QL: NEGATIVE
COLOR UR: YELLOW
GLUCOSE UR QL STRIP.AUTO: NEGATIVE MG/DL
KETONES UR-MCNC: NEGATIVE MG/DL
LEUKOCYTE ESTERASE UR QL STRIP: NEGATIVE
NITRITE UR QL: NEGATIVE
PH UR: 6 [PH] (ref 5–9)
PROT UR QL: NEGATIVE MG/DL
RBC # UR STRIP: NEGATIVE /UL
SERVICE CMNT-IMP: NORMAL
SP GR UR: <1.005 (ref 1–1.02)
UROBILINOGEN UR QL: 0.2 EU/DL (ref 0.2–1)

## 2022-06-04 PROCEDURE — 81003 URINALYSIS AUTO W/O SCOPE: CPT

## 2022-06-04 RX ORDER — BUSPIRONE HYDROCHLORIDE 10 MG/1
10 TABLET ORAL 2 TIMES DAILY
COMMUNITY

## 2022-06-05 VITALS
TEMPERATURE: 98.3 F | SYSTOLIC BLOOD PRESSURE: 119 MMHG | OXYGEN SATURATION: 98 % | DIASTOLIC BLOOD PRESSURE: 75 MMHG | BODY MASS INDEX: 30.82 KG/M2 | RESPIRATION RATE: 16 BRPM | HEIGHT: 65 IN | HEART RATE: 75 BPM | WEIGHT: 185 LBS

## 2022-06-05 PROCEDURE — 99283 EMERGENCY DEPT VISIT LOW MDM: CPT

## 2022-06-05 PROCEDURE — 59025 FETAL NON-STRESS TEST: CPT

## 2022-06-05 NOTE — PROGRESS NOTES
2310Imer Ort reports to L&D triage complaining of intermittent abdominal pain over the last 2 hours. Denies LOF/ bleeding/ discharge. Valentin Zabala CNM  Notified. 2330: LENCHO Glass to bedside, assessed patient. Ok to discharge to home, educated on when to call.

## 2022-06-05 NOTE — PROGRESS NOTES
HPI:    Subjective: here for intermittent sharp pain in lower abdomen, lasting 10 sec., denies reg. Ctx, VB or LOF, reports good FM, denies dysuria or urinary frequency, denies vaginal discharge     Michael Marina a 32 y.o.   at 28w1d presents to L&D with c/o see above. Assessment:  Past Medical History:   Diagnosis Date    Anxiety     Depression     MRSA (methicillin resistant Staphylococcus aureus) 2019     History reviewed. No pertinent surgical history. Allergies   Allergen Reactions    Hydrocodone Hives     Prior to Admission medications    Medication Sig Start Date End Date Taking? Authorizing Provider   PNV Comb #2-Iron-FA-Omega 3 29-1-400 mg cmpk Take  by mouth. Yes Provider, Historical   busPIRone (BUSPAR) 10 mg tablet Take 10 mg by mouth two (2) times a day. Yes Provider, Historical   acetaminophen (TYLENOL) 500 mg tablet Take 2 Tabs by mouth every eight (8) hours as needed for Pain. 19   Danilo Oscar MD        Objective:     Vitals: 119/75 B/P  Vitals:    22 2326   Weight: 83.9 kg (185 lb)   Height: 5' 5\" (1.651 m)        Physical Exam:  Patient without distress. Abdomen: soft, nontender  Fundus: soft and non tender  Perineum: blood absent, amniotic fluid absent  Membranes:  Intact  Fetal Heart Rate: Reactive  Baseline: 125 per minute  Variability: moderate  Accelerations: yes  Decelerations: none  Uterine contractions: none  Uterine irritability: no  Cervical exam: Deferred  U/A: Urine dipstick shows negative for all components. S.G. 1.005    Assessment/Plan: reassuring fetal status, no e/o PTL or UTI, round ligament pain     Plan: DC home with standard & ER labor precautions. Follow-up in office for routine visit.      Signed By:  Edis Charles CNM     2022

## 2022-08-10 ENCOUNTER — HOSPITAL ENCOUNTER (EMERGENCY)
Age: 27
Discharge: HOME | End: 2022-08-10
Attending: OBSTETRICS & GYNECOLOGY | Admitting: OBSTETRICS & GYNECOLOGY
Payer: MEDICAID

## 2022-08-10 VITALS
HEIGHT: 65 IN | HEART RATE: 94 BPM | TEMPERATURE: 98.1 F | SYSTOLIC BLOOD PRESSURE: 129 MMHG | DIASTOLIC BLOOD PRESSURE: 82 MMHG | RESPIRATION RATE: 18 BRPM | WEIGHT: 203 LBS | BODY MASS INDEX: 33.82 KG/M2

## 2022-08-10 LAB
ALBUMIN SERPL-MCNC: 2.3 G/DL (ref 3.4–5)
ALBUMIN/GLOB SERPL: 0.7 {RATIO} (ref 0.8–1.7)
ALP SERPL-CCNC: 311 U/L (ref 45–117)
ALT SERPL-CCNC: 37 U/L (ref 13–56)
ANION GAP SERPL CALC-SCNC: 11 MMOL/L (ref 3–18)
APPEARANCE UR: CLEAR
AST SERPL-CCNC: 44 U/L (ref 10–38)
BILIRUB SERPL-MCNC: 0.2 MG/DL (ref 0.2–1)
BILIRUB UR QL: NEGATIVE
BUN SERPL-MCNC: 4 MG/DL (ref 7–18)
BUN/CREAT SERPL: 5 (ref 12–20)
CALCIUM SERPL-MCNC: 8.8 MG/DL (ref 8.5–10.1)
CHLORIDE SERPL-SCNC: 107 MMOL/L (ref 100–111)
CO2 SERPL-SCNC: 20 MMOL/L (ref 21–32)
COLOR UR: YELLOW
CREAT SERPL-MCNC: 0.78 MG/DL (ref 0.6–1.3)
CREAT UR-MCNC: 50 MG/DL (ref 30–125)
ERYTHROCYTE [DISTWIDTH] IN BLOOD BY AUTOMATED COUNT: 12.4 % (ref 11.6–14.5)
GLOBULIN SER CALC-MCNC: 3.5 G/DL (ref 2–4)
GLUCOSE SERPL-MCNC: 117 MG/DL (ref 74–99)
GLUCOSE UR QL STRIP.AUTO: NEGATIVE MG/DL
GRBS, EXTERNAL: POSITIVE
HCT VFR BLD AUTO: 31.5 % (ref 35–45)
HGB BLD-MCNC: 10.4 G/DL (ref 12–16)
KETONES UR-MCNC: NEGATIVE MG/DL
LEUKOCYTE ESTERASE UR QL STRIP: NEGATIVE
MCH RBC QN AUTO: 29.3 PG (ref 24–34)
MCHC RBC AUTO-ENTMCNC: 33 G/DL (ref 31–37)
MCV RBC AUTO: 88.7 FL (ref 78–100)
NITRITE UR QL: NEGATIVE
NRBC # BLD: 0 K/UL (ref 0–0.01)
NRBC BLD-RTO: 0 PER 100 WBC
PH UR: 7.5 [PH] (ref 5–9)
PLATELET # BLD AUTO: 278 K/UL (ref 135–420)
PMV BLD AUTO: 10.8 FL (ref 9.2–11.8)
POTASSIUM SERPL-SCNC: 3.7 MMOL/L (ref 3.5–5.5)
PROT SERPL-MCNC: 5.8 G/DL (ref 6.4–8.2)
PROT UR QL: NEGATIVE MG/DL
PROT UR-MCNC: 12 MG/DL
PROT/CREAT UR-RTO: 0.2
RBC # BLD AUTO: 3.55 M/UL (ref 4.2–5.3)
RBC # UR STRIP: NEGATIVE /UL
SERVICE CMNT-IMP: NORMAL
SODIUM SERPL-SCNC: 138 MMOL/L (ref 136–145)
SP GR UR: 1.02 (ref 1–1.02)
URATE SERPL-MCNC: 5.5 MG/DL (ref 2.6–7.2)
UROBILINOGEN UR QL: 0.2 EU/DL (ref 0.2–1)
WBC # BLD AUTO: 10.2 K/UL (ref 4.6–13.2)

## 2022-08-10 PROCEDURE — 85027 COMPLETE CBC AUTOMATED: CPT

## 2022-08-10 PROCEDURE — 84550 ASSAY OF BLOOD/URIC ACID: CPT

## 2022-08-10 PROCEDURE — 80053 COMPREHEN METABOLIC PANEL: CPT

## 2022-08-10 PROCEDURE — 59025 FETAL NON-STRESS TEST: CPT

## 2022-08-10 PROCEDURE — 84156 ASSAY OF PROTEIN URINE: CPT

## 2022-08-10 PROCEDURE — 99283 EMERGENCY DEPT VISIT LOW MDM: CPT

## 2022-08-10 PROCEDURE — 81003 URINALYSIS AUTO W/O SCOPE: CPT

## 2022-08-10 RX ORDER — CALCIUM CARBONATE 200(500)MG
1 TABLET,CHEWABLE ORAL AS NEEDED
COMMUNITY

## 2022-08-10 NOTE — DISCHARGE INSTRUCTIONS
Keep all scheduled appointments. Drink plenty of fluids and eat regularly throughout the day. Call your OB or return to the hospital for any vaginal bleeding, if you think your water has ruptured, if you experience a severe headache that is not relieved by Tylenol or rest, contractions every 3-5 minutes, or if you notice decreased fetal movement. Continue to do kick counts daily. Week 40 of Your Pregnancy: Care Instructions  Overview     You are near the end of your pregnancy--and you're probably pretty uncomfortable. It may be harder to walk around. Lying down probably isn't comfortable either. You may have trouble getting to sleep or staying asleep. Most babies are born between 40 and 41 weeks. This is a good time to think about packing a bag for the hospital with items you'll need. Then you'll be ready when labor starts. Follow-up care is a key part of your treatment and safety. Be sure to make and go to all appointments, and call your doctor if you are having problems. It's also a good idea to know your test results and keep a list of the medicines you take. How can you care for yourself at home? Learn about breastfeeding  Breastfeeding is best for your baby and good for you. Breast milk has antibodies to help your baby fight infections. If you breastfeed, you may lose weight faster. That's because making milk burns calories. Learning the best ways to hold your baby will make breastfeeding easier. Sometimes breastfeeding can make partners feel left out. If you have a partner, plan how you can care for your baby together. For example, your partner can bathe and diaper the baby. You can snuggle together when you breastfeed. You may want to learn how to use a breast pump and store your milk. If you choose to bottle feed, make the feeding feel like breastfeeding so you can bond with your baby. Always hold your baby and the bottle. Don't prop bottles or let your baby fall asleep with a bottle.   Learn about crying  It's common for babies to cry for 1 to 3 hours a day. Some cry more, and some cry less. Babies don't cry to make you upset or because you're a bad parent. Crying is how your baby communicates. Your baby may be hungry; have gas; need a diaper change; or feel cold, warm, tired, lonely, or tense. Sometimes babies cry for unknown reasons. If you respond to your baby's needs, your baby will learn to trust you. Try to stay calm when your baby cries. Your baby may get more upset if they sense that you are upset. Know how to care for your   Your baby's umbilical cord stump will drop off on its own, usually between 1 and 2 weeks. To care for your baby's umbilical cord area:  Clean the area at the bottom of the cord 2 or 3 times a day. Pay special attention to the area where the cord attaches to the skin. Keep the diaper folded below the cord. Use a damp washcloth or cotton ball to sponge bathe your baby until the stump has come off. Your baby's first dark stool is called meconium. After the meconium is passed, your baby will develop their own bowel pattern. Some babies, especially  babies, have several bowel movements a day. Others have one or two a day, or one every 2 to 3 days.  babies often have loose, yellow stools. Formula-fed babies have more formed stools. If your baby's stools look like little pellets, your baby is constipated. After 2 days of constipation, call your baby's doctor. If your baby will be circumcised, you can care for your baby at home. Gently rinse your baby's penis with warm water after every diaper change. Don't try to remove the film that forms on the penis. This film will go away on its own. Pat dry. Put petroleum ointment, such as Vaseline, on the area of the diaper that will touch your baby's penis. This will keep the diaper from sticking to your baby. Ask the doctor about giving your baby acetaminophen (Tylenol) for pain.   Where can you learn more? Go to http://www.gray.com/  Enter N257 in the search box to learn more about \"Week 37 of Your Pregnancy: Care Instructions. \"  Current as of: June 16, 2021               Content Version: 13.2  © 3413-7334 [employer]. Care instructions adapted under license by [employer] (which disclaims liability or warranty for this information). If you have questions about a medical condition or this instruction, always ask your healthcare professional. [place] [address] any warranty or liability for your use of this information. Carlos Chock Contractions: Care Instructions  Your Care Instructions     Modena Calles contractions prepare your uterus for labor. Think of them as a \"warm-up\" exercise that your body does. You may begin to feel them between the 28th and 30th weeks of your pregnancy. But they start as early as the 20th week. Glenn Calles contractions usually occur more often during the ninth month. They may go away when you are active and return when you rest. These contractions are like mild contractions of true labor, but they occur less often. (You feel fewer than 8 in an hour.) They don't cause your cervix to open. It may be hard for you to tell the difference between Carlos Chock contractions and true labor, especially in your first pregnancy. Follow-up care is a key part of your treatment and safety. Be sure to make and go to all appointments, and call your doctor if you are having problems. It's also a good idea to know your test results and keep a list of the medicines you take. How can you care for yourself at home? Try a warm bath to help relieve muscle tension and reduce pain. Change positions every 30 minutes. Take breaks if you must sit for a long time. Get up and walk around. Drink plenty of water. Taking short walks may help you feel better.   Your doctor needs to check any contractions that are getting stronger or closer together. Where can you learn more? Go to http://www.gray.com/  Enter Z402 in the search box to learn more about \"Glenn Calles Contractions: Care Instructions. \"  Current as of: June 16, 2021               Content Version: 13.2  © 8906-5228 [employer]. Care instructions adapted under license by [employer] (which disclaims liability or warranty for this information). If you have questions about a medical condition or this instruction, always ask your healthcare professional. [address] any warranty or liability for your use of this information. Belly Pain in Pregnancy: Care Instructions  Overview     When you're pregnant, any belly pain can be a worry. You may not want to call your doctor or midwife about every pain you have. But you don't want to miss something that is dangerous for you or your baby. Even if it feels familiar, belly pain can mean something new when you're pregnant. It's important to know when to call your doctor or midwife. It will also help to know how to care for yourself at home when your pain is not caused by anything harmful. When belly pain is more severe or constant, see a doctor or midwife right away. If you're sure your belly pain is a sign of labor, call your doctor or midwife. When belly pain is brief, it's usually a normal part of pregnancy. It might be related to changes in the growing uterus. Or it could be the stretching of ligaments called round ligaments. These ligaments help support the uterus. Round ligament pain can be on either side of your belly. It can also be felt in your hips or groin. Follow-up care is a key part of your treatment and safety. Be sure to make and go to all appointments, and call your doctor if you are having problems. It's also a good idea to know your test results and keep a list of the medicines you take.   How can you tell if belly pain is a sign of labor? When belly pain is caused by labor, it can feel like mild or menstrual-like cramps in your lower belly. These cramps are probably contractions. They can happen in your second or third trimester. You may also have:  A steady, dull ache in your lower back, pelvis, or thighs. A feeling of pressure in your pelvis or lower belly. Changes in your vaginal discharge or a sudden release of fluid from the vagina. If you think you are in labor, call your doctor. How can you care for yourself at home? When belly pain is mild and is not a symptom of labor:  Rest until you feel better. Take a warm bath. Think about what you drink and eat:  Drink plenty of fluids. Choose water and other clear liquids until you feel better. Try eating small, frequent meals. If your stomach is upset, try bland, low-fat foods like plain rice, broiled chicken, toast, and yogurt. Think about how you move if you are having brief pains from stretching of the round ligaments. Try gentle stretching. Move a little more slowly when turning in bed or getting up from a chair, so those ligaments don't stretch quickly. Lean forward a bit if you think you are going to cough or sneeze. When should you call for help? Call 911  anytime you think you may need emergency care. For example, call if:    You have sudden, severe pain in your belly. You have severe vaginal bleeding. You passed out (lost consciousness). You have a seizure. Call your doctor now or seek immediate medical care if:    You have new or worse belly pain or cramping. You have any vaginal bleeding. You have a fever. You have symptoms of preeclampsia, such as:  Sudden swelling of your face, hands, or feet. New vision problems (such as dimness, blurring, or seeing spots). A severe headache. You think that you may be in labor.  This means that you've had at least 8 contractions within 1 hour or at least 4 contractions within 20 minutes, even after you change your position and drink fluids. You have symptoms of a urinary tract infection. These may include:  Pain or burning when you urinate. A frequent need to urinate without being able to pass much urine. Pain in the flank, which is just below the rib cage and above the waist on either side of the back. Blood in your urine. Watch closely for changes in your health, and be sure to contact your doctor if you are worried about your or your baby's health. Where can you learn more? Go to http://www.urbina.com/  Enter B275 in the search box to learn more about \"Belly Pain in Pregnancy: Care Instructions. \"  Current as of: 2021               Content Version: 13.2   [employer]. Care instructions adapted under license by [employer] (which disclaims liability or warranty for this information). If you have questions about a medical condition or this instruction, always ask your healthcare professional. [place] [address] any warranty or liability for your use of this information. Pregnancy Precautions: Care Instructions  Your Care Instructions     There is no sure way to prevent labor before your due date ( labor) or to prevent most other pregnancy problems. But there are things you can do to increase your chances of a healthy pregnancy. Go to your appointments, follow your doctor's advice, and take good care of yourself. Eat well, and exercise (if your doctor agrees). And make sure to drink plenty of water. Follow-up care is a key part of your treatment and safety. Be sure to make and go to all appointments, and call your doctor if you are having problems. It's also a good idea to know your test results and keep a list of the medicines you take. How can you care for yourself at home? Make sure you go to your prenatal appointments. At each visit, your doctor will check your blood pressure.  Your doctor will also check to see if you have protein in your urine. High blood pressure and protein in urine are signs of preeclampsia. This condition can be dangerous for you and your baby. Drink plenty of fluids. Dehydration can cause contractions. If you have kidney, heart, or liver disease and have to limit fluids, talk with your doctor before you increase the amount of fluids you drink. Tell your doctor right away if you notice any symptoms of an infection, such as:  Burning when you urinate. A foul-smelling discharge from your vagina. Vaginal itching. Unexplained fever. Unusual pain or soreness in your uterus or lower belly. Eat a balanced diet. Include plenty of foods that are high in calcium and iron. Foods high in calcium include milk, cheese, yogurt, almonds, and broccoli. Foods high in iron include red meat, shellfish, poultry, eggs, beans, raisins, whole-grain bread, and leafy green vegetables. Do not smoke. If you need help quitting, talk to your doctor about stop-smoking programs and medicines. These can increase your chances of quitting for good. Do not drink alcohol or use marijuana or illegal drugs. Follow your doctor's directions about activity. Your doctor will let you know how much, if any, exercise you can do. Ask your doctor if you can have sex. If you are at risk for early labor, your doctor may ask you to not have sex. Take care to prevent falls. During pregnancy, your joints are loose, and your balance is off. Sports such as bicycling, skiing, or in-line skating can increase your risk of falling. And don't ride horses or motorcycles, dive, water ski, scuba dive, or parachute jump while you are pregnant. Avoid things that can make your body too hot and may be harmful to your baby, such as a hot tub or sauna. Or talk with your doctor before doing anything that raises your body temperature. Your doctor can tell you if it's safe.   Do not take any over-the-counter or herbal medicines or supplements without talking to your doctor or pharmacist first.  When should you call for help? Call 911  anytime you think you may need emergency care. For example, call if:    You passed out (lost consciousness). You have a seizure. You have severe vaginal bleeding. You have severe pain in your belly or pelvis. You have had fluid gushing or leaking from your vagina and you know or think the umbilical cord is bulging into your vagina. If this happens, immediately get down on your knees so your rear end (buttocks) is higher than your head. This will decrease the pressure on the cord until help arrives. Call your doctor now or seek immediate medical care if:    You have signs of preeclampsia, such as:  Sudden swelling of your face, hands, or feet. New vision problems (such as dimness, blurring, or seeing spots). A severe headache. You have any vaginal bleeding. You have belly pain or cramping. You have a fever. You have had regular contractions (with or without pain) for an hour. This means that you have 8 or more within 1 hour or 4 or more in 20 minutes after you change your position and drink fluids. You have a sudden release of fluid from your vagina. You have low back pain or pelvic pressure that does not go away. You notice that your baby has stopped moving or is moving much less than normal.   Watch closely for changes in your health, and be sure to contact your doctor if you have any problems. Where can you learn more? Go to http://www.urbina.com/  Enter Y951 in the search box to learn more about \"Pregnancy Precautions: Care Instructions. \"  Current as of: June 16, 2021               Content Version: 13.2  © 3482-9357 [employer]. Care instructions adapted under license by [employer] (which disclaims liability or warranty for this information).  If you have questions about a medical condition or this instruction, always ask your healthcare professional. [place] [address] any warranty or liability for your use of this information.

## 2022-08-10 NOTE — PROGRESS NOTES
1638: Pt at 37w4d arrives to L&D triage after high blood pressure readings in the office. Pt denies headache, visual changes, RUQ pain, bleeding, or loss of fluid. Positive fetal movement. Pt states she feels contractions that have gotten worse this afternoon. EFM and [place] applied. Labs drawn. 1808Emalagustin Swift CNM updated, orders to discharge pt.     1820: Discharge instructions given, pt verbalizes understanding, no questions or concerns at this time.

## 2022-08-16 ENCOUNTER — HOSPITAL ENCOUNTER (EMERGENCY)
Age: 27
Discharge: HOME | DRG: 560 | End: 2022-08-16
Attending: OBSTETRICS & GYNECOLOGY | Admitting: OBSTETRICS & GYNECOLOGY
Payer: MEDICAID

## 2022-08-16 VITALS
WEIGHT: 203 LBS | BODY MASS INDEX: 34.66 KG/M2 | TEMPERATURE: 97.4 F | HEART RATE: 97 BPM | OXYGEN SATURATION: 100 % | DIASTOLIC BLOOD PRESSURE: 92 MMHG | RESPIRATION RATE: 16 BRPM | SYSTOLIC BLOOD PRESSURE: 131 MMHG | HEIGHT: 64 IN

## 2022-08-16 PROCEDURE — 59025 FETAL NON-STRESS TEST: CPT

## 2022-08-16 PROCEDURE — 99284 EMERGENCY DEPT VISIT MOD MDM: CPT

## 2022-08-16 NOTE — PROGRESS NOTES
0421 Pt arrived ambulatory from home with complaint of itching hands and concern for cholestasis. Pt is a  with hx of Hep C.    0448 [address] notified of pt arrival complaint of itching hands and concerns with cholestasis. Reactive NST and occasional contraction. Orders given to discharge pt home with labor precautions and to follow up in the office with her regular scheduled visit. 9283 Discussed with pt to call her doctor if she has bleeding like a period, leakage of fluid, contractions every 3-5minutes for more than an hour. Discussed with pt that the midwife reviewed her FHR strip and recommends pt be discharged home and follow up in the office with her regular scheduled visit. Pt agrees to [address] and discharge home at this time. Pt given written and verbal instructions at this time.

## 2022-08-17 ENCOUNTER — HOSPITAL ENCOUNTER (INPATIENT)
Age: 27
LOS: 3 days | Discharge: HOME | DRG: 560 | End: 2022-08-20
Attending: OBSTETRICS & GYNECOLOGY | Admitting: OBSTETRICS & GYNECOLOGY
Payer: MEDICAID

## 2022-08-17 PROBLEM — O14.90 PREECLAMPSIA: Status: ACTIVE | Noted: 2022-08-17

## 2022-08-17 PROBLEM — O13.9 GESTATIONAL HYPERTENSION: Status: ACTIVE | Noted: 2022-08-17

## 2022-08-17 PROBLEM — Z3A.38 38 WEEKS GESTATION OF PREGNANCY: Status: ACTIVE | Noted: 2022-08-17

## 2022-08-17 PROBLEM — Z33.1 IUP (INTRAUTERINE PREGNANCY), INCIDENTAL: Status: ACTIVE | Noted: 2022-08-17

## 2022-08-17 LAB
ALBUMIN SERPL-MCNC: 2.2 G/DL (ref 3.4–5)
ALBUMIN/GLOB SERPL: 0.6 {RATIO} (ref 0.8–1.7)
ALP SERPL-CCNC: 370 U/L (ref 45–117)
ALT SERPL-CCNC: 33 U/L (ref 13–56)
ANION GAP SERPL CALC-SCNC: 11 MMOL/L (ref 3–18)
APPEARANCE UR: ABNORMAL
AST SERPL-CCNC: 39 U/L (ref 10–38)
BILIRUB SERPL-MCNC: 0.2 MG/DL (ref 0.2–1)
BILIRUB UR QL: NEGATIVE
BUN SERPL-MCNC: 5 MG/DL (ref 7–18)
BUN/CREAT SERPL: 7 (ref 12–20)
CALCIUM SERPL-MCNC: 8.9 MG/DL (ref 8.5–10.1)
CHLORIDE SERPL-SCNC: 105 MMOL/L (ref 100–111)
CO2 SERPL-SCNC: 21 MMOL/L (ref 21–32)
COLOR UR: ABNORMAL
CREAT SERPL-MCNC: 0.74 MG/DL (ref 0.6–1.3)
CREAT UR-MCNC: 92 MG/DL (ref 30–125)
ERYTHROCYTE [DISTWIDTH] IN BLOOD BY AUTOMATED COUNT: 12.4 % (ref 11.6–14.5)
GLOBULIN SER CALC-MCNC: 3.5 G/DL (ref 2–4)
GLUCOSE SERPL-MCNC: 133 MG/DL (ref 74–99)
GLUCOSE UR QL STRIP.AUTO: NEGATIVE MG/DL
HCT VFR BLD AUTO: 31.5 % (ref 35–45)
HGB BLD-MCNC: 10.5 G/DL (ref 12–16)
KETONES UR-MCNC: NEGATIVE MG/DL
LDH SERPL L TO P-CCNC: 192 U/L (ref 81–234)
LEUKOCYTE ESTERASE UR QL STRIP: NEGATIVE
MCH RBC QN AUTO: 28.8 PG (ref 24–34)
MCHC RBC AUTO-ENTMCNC: 33.3 G/DL (ref 31–37)
MCV RBC AUTO: 86.5 FL (ref 78–100)
NITRITE UR QL: NEGATIVE
NRBC # BLD: 0 K/UL (ref 0–0.01)
NRBC BLD-RTO: 0 PER 100 WBC
PH UR: 7 [PH] (ref 5–9)
PLATELET # BLD AUTO: 263 K/UL (ref 135–420)
PMV BLD AUTO: 10.8 FL (ref 9.2–11.8)
POTASSIUM SERPL-SCNC: 3.5 MMOL/L (ref 3.5–5.5)
PROT SERPL-MCNC: 5.7 G/DL (ref 6.4–8.2)
PROT UR QL: NEGATIVE MG/DL
PROT UR-MCNC: 19 MG/DL
PROT/CREAT UR-RTO: 0.2
RBC # BLD AUTO: 3.64 M/UL (ref 4.2–5.3)
RBC # UR STRIP: ABNORMAL /UL
SERVICE CMNT-IMP: ABNORMAL
SODIUM SERPL-SCNC: 137 MMOL/L (ref 136–145)
SP GR UR: 1.02 (ref 1–1.02)
URATE SERPL-MCNC: 5.3 MG/DL (ref 2.6–7.2)
UROBILINOGEN UR QL: 0.2 EU/DL (ref 0.2–1)
WBC # BLD AUTO: 9.8 K/UL (ref 4.6–13.2)

## 2022-08-17 PROCEDURE — 81003 URINALYSIS AUTO W/O SCOPE: CPT

## 2022-08-17 PROCEDURE — 65270000029 HC RM PRIVATE

## 2022-08-17 PROCEDURE — 83615 LACTATE (LD) (LDH) ENZYME: CPT

## 2022-08-17 PROCEDURE — 85027 COMPLETE CBC AUTOMATED: CPT

## 2022-08-17 PROCEDURE — 36415 COLL VENOUS BLD VENIPUNCTURE: CPT

## 2022-08-17 PROCEDURE — 84550 ASSAY OF BLOOD/URIC ACID: CPT

## 2022-08-17 PROCEDURE — 74011250636 HC RX REV CODE- 250/636: Performed by: MIDWIFE

## 2022-08-17 PROCEDURE — 59025 FETAL NON-STRESS TEST: CPT

## 2022-08-17 PROCEDURE — 84156 ASSAY OF PROTEIN URINE: CPT

## 2022-08-17 PROCEDURE — 74011250637 HC RX REV CODE- 250/637: Performed by: MIDWIFE

## 2022-08-17 PROCEDURE — 80053 COMPREHEN METABOLIC PANEL: CPT

## 2022-08-17 PROCEDURE — 86900 BLOOD TYPING SEROLOGIC ABO: CPT

## 2022-08-17 RX ORDER — METHYLERGONOVINE MALEATE 0.2 MG/ML
0.2 INJECTION INTRAVENOUS AS NEEDED
Status: DISCONTINUED | OUTPATIENT
Start: 2022-08-17 | End: 2022-08-19 | Stop reason: HOSPADM

## 2022-08-17 RX ORDER — OXYTOCIN/RINGER'S LACTATE 30/500 ML
10 PLASTIC BAG, INJECTION (ML) INTRAVENOUS AS NEEDED
Status: COMPLETED | OUTPATIENT
Start: 2022-08-17 | End: 2022-08-19

## 2022-08-17 RX ORDER — METOCLOPRAMIDE HYDROCHLORIDE 5 MG/ML
10 INJECTION INTRAMUSCULAR; INTRAVENOUS ONCE
Status: COMPLETED | OUTPATIENT
Start: 2022-08-17 | End: 2022-08-17

## 2022-08-17 RX ORDER — HYDROMORPHONE HYDROCHLORIDE 1 MG/ML
1 INJECTION, SOLUTION INTRAMUSCULAR; INTRAVENOUS; SUBCUTANEOUS
Status: DISCONTINUED | OUTPATIENT
Start: 2022-08-17 | End: 2022-08-19 | Stop reason: HOSPADM

## 2022-08-17 RX ORDER — NALBUPHINE HYDROCHLORIDE 10 MG/ML
10 INJECTION, SOLUTION INTRAMUSCULAR; INTRAVENOUS; SUBCUTANEOUS
Status: DISCONTINUED | OUTPATIENT
Start: 2022-08-17 | End: 2022-08-19 | Stop reason: HOSPADM

## 2022-08-17 RX ORDER — DIPHENHYDRAMINE HCL 25 MG
25 TABLET ORAL
COMMUNITY

## 2022-08-17 RX ORDER — TERBUTALINE SULFATE 1 MG/ML
0.25 INJECTION SUBCUTANEOUS
Status: DISCONTINUED | OUTPATIENT
Start: 2022-08-17 | End: 2022-08-19 | Stop reason: HOSPADM

## 2022-08-17 RX ORDER — BUTORPHANOL TARTRATE 2 MG/ML
2 INJECTION INTRAMUSCULAR; INTRAVENOUS
Status: DISCONTINUED | OUTPATIENT
Start: 2022-08-17 | End: 2022-08-19 | Stop reason: HOSPADM

## 2022-08-17 RX ORDER — LIDOCAINE HYDROCHLORIDE 10 MG/ML
20 INJECTION, SOLUTION EPIDURAL; INFILTRATION; INTRACAUDAL; PERINEURAL AS NEEDED
Status: DISCONTINUED | OUTPATIENT
Start: 2022-08-17 | End: 2022-08-19 | Stop reason: HOSPADM

## 2022-08-17 RX ORDER — ONDANSETRON 2 MG/ML
4 INJECTION INTRAMUSCULAR; INTRAVENOUS
Status: DISCONTINUED | OUTPATIENT
Start: 2022-08-17 | End: 2022-08-19 | Stop reason: HOSPADM

## 2022-08-17 RX ORDER — OXYTOCIN/0.9 % SODIUM CHLORIDE 30/500 ML
87.3 PLASTIC BAG, INJECTION (ML) INTRAVENOUS AS NEEDED
Status: DISCONTINUED | OUTPATIENT
Start: 2022-08-17 | End: 2022-08-19 | Stop reason: HOSPADM

## 2022-08-17 RX ORDER — MINERAL OIL
30 OIL (ML) ORAL AS NEEDED
Status: DISCONTINUED | OUTPATIENT
Start: 2022-08-17 | End: 2022-08-19 | Stop reason: HOSPADM

## 2022-08-17 RX ORDER — METOCLOPRAMIDE HYDROCHLORIDE 5 MG/ML
10 INJECTION INTRAMUSCULAR; INTRAVENOUS ONCE
Status: DISCONTINUED | OUTPATIENT
Start: 2022-08-17 | End: 2022-08-17

## 2022-08-17 RX ORDER — BUTALBITAL, ACETAMINOPHEN AND CAFFEINE 50; 325; 40 MG/1; MG/1; MG/1
1 TABLET ORAL ONCE
Status: COMPLETED | OUTPATIENT
Start: 2022-08-17 | End: 2022-08-17

## 2022-08-17 RX ORDER — SODIUM CHLORIDE, SODIUM LACTATE, POTASSIUM CHLORIDE, CALCIUM CHLORIDE 600; 310; 30; 20 MG/100ML; MG/100ML; MG/100ML; MG/100ML
125 INJECTION, SOLUTION INTRAVENOUS CONTINUOUS
Status: DISCONTINUED | OUTPATIENT
Start: 2022-08-17 | End: 2022-08-19 | Stop reason: HOSPADM

## 2022-08-17 RX ADMIN — METOCLOPRAMIDE 10 MG: 5 INJECTION, SOLUTION INTRAMUSCULAR; INTRAVENOUS at 20:25

## 2022-08-17 RX ADMIN — BUTALBITAL, ACETAMINOPHEN, AND CAFFEINE 1 TABLET: 50; 325; 40 TABLET ORAL at 19:05

## 2022-08-17 NOTE — PROGRESS NOTES
1920  Bedside and verbal report received by LENCHO Gupta RN, care assumed at this time. 1172 Verbal report given to SATHISH Caba, care relinquished at this time.

## 2022-08-17 NOTE — PROGRESS NOTES
Patient presents to labor and delivery  38weeks 4days from the office for elevated blood pressure, headache and swelling    1835- LALO Shaver at bedside to discuss plan of care

## 2022-08-18 ENCOUNTER — ANESTHESIA (OUTPATIENT)
Dept: LABOR AND DELIVERY | Age: 27
DRG: 560 | End: 2022-08-18
Payer: MEDICAID

## 2022-08-18 ENCOUNTER — ANESTHESIA EVENT (OUTPATIENT)
Dept: LABOR AND DELIVERY | Age: 27
DRG: 560 | End: 2022-08-18
Payer: MEDICAID

## 2022-08-18 LAB
ABO + RH BLD: NORMAL
BASOPHILS # BLD: 0.1 K/UL (ref 0–0.1)
BASOPHILS NFR BLD: 1 % (ref 0–2)
BLOOD GROUP ANTIBODIES SERPL: NORMAL
DIFFERENTIAL METHOD BLD: ABNORMAL
EOSINOPHIL # BLD: 0.1 K/UL (ref 0–0.4)
EOSINOPHIL NFR BLD: 1 % (ref 0–5)
ERYTHROCYTE [DISTWIDTH] IN BLOOD BY AUTOMATED COUNT: 12.5 % (ref 11.6–14.5)
HCT VFR BLD AUTO: 32.6 % (ref 35–45)
HGB BLD-MCNC: 10.7 G/DL (ref 12–16)
IMM GRANULOCYTES # BLD AUTO: 0.1 K/UL (ref 0–0.04)
IMM GRANULOCYTES NFR BLD AUTO: 1 % (ref 0–0.5)
LYMPHOCYTES # BLD: 2.4 K/UL (ref 0.9–3.6)
LYMPHOCYTES NFR BLD: 24 % (ref 21–52)
MCH RBC QN AUTO: 29.1 PG (ref 24–34)
MCHC RBC AUTO-ENTMCNC: 32.8 G/DL (ref 31–37)
MCV RBC AUTO: 88.6 FL (ref 78–100)
MONOCYTES # BLD: 0.6 K/UL (ref 0.05–1.2)
MONOCYTES NFR BLD: 6 % (ref 3–10)
NEUTS SEG # BLD: 7 K/UL (ref 1.8–8)
NEUTS SEG NFR BLD: 69 % (ref 40–73)
NRBC # BLD: 0 K/UL (ref 0–0.01)
NRBC BLD-RTO: 0 PER 100 WBC
PLATELET # BLD AUTO: 244 K/UL (ref 135–420)
PMV BLD AUTO: 11.9 FL (ref 9.2–11.8)
RBC # BLD AUTO: 3.68 M/UL (ref 4.2–5.3)
SPECIMEN EXP DATE BLD: NORMAL
WBC # BLD AUTO: 10.3 K/UL (ref 4.6–13.2)

## 2022-08-18 PROCEDURE — 65270000029 HC RM PRIVATE

## 2022-08-18 PROCEDURE — 85025 COMPLETE CBC W/AUTO DIFF WBC: CPT

## 2022-08-18 PROCEDURE — 0UQMXZZ REPAIR VULVA, EXTERNAL APPROACH: ICD-10-PCS | Performed by: OBSTETRICS & GYNECOLOGY

## 2022-08-18 PROCEDURE — 74011250636 HC RX REV CODE- 250/636: Performed by: MIDWIFE

## 2022-08-18 PROCEDURE — 00HU33Z INSERTION OF INFUSION DEVICE INTO SPINAL CANAL, PERCUTANEOUS APPROACH: ICD-10-PCS | Performed by: OBSTETRICS & GYNECOLOGY

## 2022-08-18 PROCEDURE — 74011250637 HC RX REV CODE- 250/637: Performed by: MIDWIFE

## 2022-08-18 PROCEDURE — 74011000258 HC RX REV CODE- 258: Performed by: MIDWIFE

## 2022-08-18 PROCEDURE — 77030007879 HC KT SPN EPDRL TELE -B: Performed by: REGISTERED NURSE

## 2022-08-18 RX ORDER — SODIUM CHLORIDE 0.9 % (FLUSH) 0.9 %
5-40 SYRINGE (ML) INJECTION AS NEEDED
Status: DISCONTINUED | OUTPATIENT
Start: 2022-08-18 | End: 2022-08-19 | Stop reason: HOSPADM

## 2022-08-18 RX ORDER — FENTANYL/ROPIVACAINE/NS/PF 2MCG/ML-.1
1-15 PLASTIC BAG, INJECTION (ML) EPIDURAL CONTINUOUS
Status: DISCONTINUED | OUTPATIENT
Start: 2022-08-19 | End: 2022-08-19 | Stop reason: HOSPADM

## 2022-08-18 RX ORDER — OXYTOCIN/0.9 % SODIUM CHLORIDE 30/500 ML
0-20 PLASTIC BAG, INJECTION (ML) INTRAVENOUS
Status: DISCONTINUED | OUTPATIENT
Start: 2022-08-18 | End: 2022-08-19

## 2022-08-18 RX ORDER — SODIUM CHLORIDE 0.9 % (FLUSH) 0.9 %
5-40 SYRINGE (ML) INJECTION EVERY 8 HOURS
Status: DISCONTINUED | OUTPATIENT
Start: 2022-08-19 | End: 2022-08-19 | Stop reason: HOSPADM

## 2022-08-18 RX ORDER — ACETAMINOPHEN 325 MG/1
650 TABLET ORAL
Status: DISCONTINUED | OUTPATIENT
Start: 2022-08-18 | End: 2022-08-19

## 2022-08-18 RX ORDER — FENTANYL/ROPIVACAINE/NS/PF 2MCG/ML-.1
PLASTIC BAG, INJECTION (ML) EPIDURAL
Status: COMPLETED
Start: 2022-08-18 | End: 2022-08-19

## 2022-08-18 RX ORDER — FENTANYL CITRATE 50 UG/ML
100 INJECTION, SOLUTION INTRAMUSCULAR; INTRAVENOUS ONCE
Status: COMPLETED | OUTPATIENT
Start: 2022-08-19 | End: 2022-08-19

## 2022-08-18 RX ORDER — NALOXONE HYDROCHLORIDE 0.4 MG/ML
0.2 INJECTION, SOLUTION INTRAMUSCULAR; INTRAVENOUS; SUBCUTANEOUS AS NEEDED
Status: DISCONTINUED | OUTPATIENT
Start: 2022-08-18 | End: 2022-08-19 | Stop reason: HOSPADM

## 2022-08-18 RX ORDER — PHENYLEPHRINE HCL IN 0.9% NACL 1 MG/10 ML
80 SYRINGE (ML) INTRAVENOUS AS NEEDED
Status: DISCONTINUED | OUTPATIENT
Start: 2022-08-18 | End: 2022-08-19 | Stop reason: HOSPADM

## 2022-08-18 RX ORDER — FENTANYL CITRATE 50 UG/ML
INJECTION, SOLUTION INTRAMUSCULAR; INTRAVENOUS
Status: COMPLETED
Start: 2022-08-18 | End: 2022-08-19

## 2022-08-18 RX ADMIN — SODIUM CHLORIDE 5 MILLION UNITS: 900 INJECTION INTRAVENOUS at 03:17

## 2022-08-18 RX ADMIN — Medication 10 MILLI-UNITS/MIN: at 13:18

## 2022-08-18 RX ADMIN — BUTORPHANOL TARTRATE 2 MG: 2 INJECTION, SOLUTION INTRAMUSCULAR; INTRAVENOUS at 16:22

## 2022-08-18 RX ADMIN — BUTORPHANOL TARTRATE 2 MG: 2 INJECTION, SOLUTION INTRAMUSCULAR; INTRAVENOUS at 22:05

## 2022-08-18 RX ADMIN — SODIUM CHLORIDE 2.5 MILLION UNITS: 9 INJECTION, SOLUTION INTRAVENOUS at 18:55

## 2022-08-18 RX ADMIN — BUTORPHANOL TARTRATE 2 MG: 2 INJECTION, SOLUTION INTRAMUSCULAR; INTRAVENOUS at 13:17

## 2022-08-18 RX ADMIN — SODIUM CHLORIDE, POTASSIUM CHLORIDE, SODIUM LACTATE AND CALCIUM CHLORIDE 125 ML/HR: 600; 310; 30; 20 INJECTION, SOLUTION INTRAVENOUS at 03:17

## 2022-08-18 RX ADMIN — ACETAMINOPHEN 650 MG: 325 TABLET ORAL at 04:58

## 2022-08-18 RX ADMIN — SODIUM CHLORIDE 2.5 MILLION UNITS: 9 INJECTION, SOLUTION INTRAVENOUS at 13:17

## 2022-08-18 RX ADMIN — Medication 2 MILLI-UNITS/MIN: at 07:22

## 2022-08-18 NOTE — H&P
History & Physical    Name: Hari Singer MRN: [id number]  SSN: [id number]    YOB: 1995  Age: 32 y.o. Sex: female        Subjective:     Estimated Date of Delivery: 22  OB History    Para Term  AB Living   4 0 0 0 2 0   SAB IAB Ectopic Molar Multiple Live Births   0 2 0 0 0 0      # Outcome Date GA Lbr Tamir/2nd Weight Sex Delivery Anes PTL Lv   4 Current            3 IAB            2 IAB            1                 Ms. Ronal Palubmo is admitted with pregnancy at 38w5d for induction of labor. Prenatal course was complicated by  gestational hypertension, Hep C infection, GBS positive, substance abuse (vaping and marijuana) and anemia . Please see prenatal records for details. Past Medical History:   Diagnosis Date    Anxiety     Depression     Disease of blood and blood forming organ     Hepatitis C    MRSA (methicillin resistant Staphylococcus aureus) 2019    Preeclampsia 2022     Past Surgical History:   Procedure Laterality Date    HX OTHER SURGICAL  2019    MRSA right thumb     Social History     Occupational History    Occupation: [employer]     Comment: twisted crab   Tobacco Use    Smoking status: Former     Types: Cigarettes     Quit date: 2022     Years since quittin.2    Smokeless tobacco: Never   Vaping Use    Vaping Use: Former   Substance and Sexual Activity    Alcohol use: Not Currently    Drug use: Not Currently     Types: Marijuana    Sexual activity: Not on file     Family History   Problem Relation Age of Onset    Hypertension Mother        Allergies   Allergen Reactions    Hydrocodone Hives     Prior to Admission medications    Medication Sig Start Date End Date Taking? Authorizing Provider   diphenhydrAMINE (Benadryl Allergy) 25 mg tablet Take 25 mg by mouth every six (6) hours as needed for Itching. Yes Provider, Historical   calcium carbonate (TUMS) 200 mg calcium (500 mg) chew Take 1 Tablet by mouth as needed.  Indications: heartburn   Yes Provider, Historical   PNV Comb #2-Iron-FA-Omega 3 29-1-400 mg cmpk Take  by mouth. Yes Provider, Historical   busPIRone (BUSPAR) 10 mg tablet Take 10 mg by mouth two (2) times a day. Yes Provider, Historical   acetaminophen (TYLENOL) 500 mg tablet Take 2 Tabs by mouth every eight (8) hours as needed for Pain. 12/8/19  Yes Mara Valero MD        Review of Systems: A comprehensive review of systems was negative except for that written in the HPI. Objective:     Vitals:  Vitals:    08/17/22 1839 08/17/22 1859 08/17/22 1919 08/17/22 1939   BP: 128/86 139/89 138/80 129/86   Pulse: 98 84 82 (!) 111   Resp:   18    Temp:   98 °F (36.7 °C)    Weight:       Height:            Physical Exam:  Patient without distress. Heart: Regular rate and rhythm or S1S2 present  Lung: clear to auscultation throughout lung fields, no wheezes, no rales, no rhonchi, and normal respiratory effort  Abdomen: soft, nontender  Fundus: soft and non tender  Perineum: blood absent, amniotic fluid absent  Cervical Exam: 3 cm dilated    50% effaced    -3 station    Presenting Part: cephalic  Membranes:  Intact  Fetal Heart Rate: Baseline: 115 per minute  Variability: moderate  Accelerations: yes  Decelerations: none  Uterine contractions: irregular    Prenatal Labs:   Lab Results   Component Value Date/Time    ABO/Rh(D) AB POSITIVE 08/17/2022 06:29 PM         Assessment/Plan:     Active Problems:    Preeclampsia (8/17/2022)      IUP (intrauterine pregnancy), incidental (8/17/2022)      38 weeks gestation of pregnancy (8/17/2022)      Gestational hypertension (8/17/2022)         Plan: Admit for  IOL for GHTN . Group B Strep was positive, will treat prophylactically with penicillin. Admission labs and IV. Continuous EFM. Continuous labor epidural when requested. Start pitocin as per protocol. Anticipate vaginal delivery. Dr. Susannah Car aware of admission and patient status.     Rhoda Vance CNM  08/18/22

## 2022-08-18 NOTE — PROGRESS NOTES
0720 : Bedside and Verbal shift change report given to CHRIS Mccollum RN (oncoming nurse) by Valerie Gudino RN (offgoing nurse). Report included the following information SBAR, Kardex, Procedure Summary, Intake/Output, MAR, and Recent Results. Pitocin started with offgoing RN    8285 : pt up on birthing ball    1940 : Bedside shift change report given to ZENON Corona RN (oncoming nurse) by Johan Mccollum RN (offgoing nurse). Report included the following information SBAR, Kardex, Procedure Summary, Intake/Output, MAR, and Recent Results.

## 2022-08-18 NOTE — PROGRESS NOTES
OB Labor Note    Assessment:   IUP in labor   Patient is comfortable in bed with epidural    Plan:   Continue to monitor labor   Anticipate   Start pitocin as per protocol    Subjective:   Pt. does not have any complaints. Pt. is feeling contractions. Pt. is feeling fetal movement. Objective:   Visit Vitals  /86   Pulse (!) 111   Temp 97.2 °F (36.2 °C)   Resp 18   Ht 5' 4\" (1.626 m)   Wt 93 kg (205 lb)   Breastfeeding No   BMI 35.19 kg/m²     Cervical exam:  3/50/3 by JOSSELINE Del Cid CNM    EFM:  Baseline: 120  Accels:  present  Decels:  absent  Variability: moderate  Contractions: regular  Category: CAT1      Karen Adam CNM  2022 6:49 AM

## 2022-08-18 NOTE — ROUTINE PROCESS
4862 Verbal report received from Rodriguez Nance RN for this patient who arrived earlier this evening and was later admitted to the L&D floor. 0530 Pt resting with resp rate greater than 10, appears comfortable and relieved of headache at this time. 0720 Bedside and Verbal shift change report given to CHRIS Rodriguez RN (oncoming nurse) by SATHISH Jasmine RN (offgoing nurse). Report included the following information SBAR, Kardex, MAR, and Recent Results.

## 2022-08-18 NOTE — PROGRESS NOTES
conducted an initial consultation and Spiritual Assessment for Lorie Crowder, who is a 32 y.o.,female. Patients Primary Language is: [place]. According to the patients EMR Taoism Affiliation is: Bianca Gomez. The reason the Patient came to the hospital is:   Patient Active Problem List    Diagnosis Date Noted    Preeclampsia 08/17/2022    IUP (intrauterine pregnancy), incidental 08/17/2022    38 weeks gestation of pregnancy 08/17/2022    Gestational hypertension 08/17/2022    Infection of fingernail of right hand 12/08/2019    Infection of thumb 12/06/2019    Vaginitis and vulvovaginitis 07/28/2019    Right ankle swelling 07/27/2019    Positive blood culture 07/27/2019    Cellulitis 07/25/2019    Sepsis ([facility] [address] 07/25/2019        The  provided the following Interventions:  Initiated a relationship of care and support. Explored issues of reyes, belief, spirituality and Congregation/ritual needs while hospitalized. Listened empathically. Provided chaplaincy education. Provided information about Spiritual Care Services. Offered assurance of continued prayers on patients behalf. Chart reviewed. The following outcomes were achieved:  Patient shared limited information about both their medical narrative and spiritual journey/beliefs. Patient processed feeling about current hospitalization. Patient expressed gratitude for pastoral care visit. Assessment:  Patient does not have any Congregation/cultural needs that will affect patients preferences in health care. There are no further spiritual or Congregation issues which require intervention at this time. Plan:  Chaplains will continue to follow and will provide pastoral care on an as needed/requested basis.  recommends bedside caregivers page  on duty if patient shows signs of acute spiritual or emotional distress.       Sister Reji Hoover, [address]  [phone]

## 2022-08-19 PROCEDURE — 76060000078 HC EPIDURAL ANESTHESIA

## 2022-08-19 PROCEDURE — 74011250636 HC RX REV CODE- 250/636: Performed by: MIDWIFE

## 2022-08-19 PROCEDURE — 65270000029 HC RM PRIVATE

## 2022-08-19 PROCEDURE — 74011250637 HC RX REV CODE- 250/637: Performed by: ADVANCED PRACTICE MIDWIFE

## 2022-08-19 PROCEDURE — 88307 TISSUE EXAM BY PATHOLOGIST: CPT

## 2022-08-19 PROCEDURE — 74011250636 HC RX REV CODE- 250/636: Performed by: REGISTERED NURSE

## 2022-08-19 PROCEDURE — 75410000002 HC LABOR FEE PER 1 HR

## 2022-08-19 PROCEDURE — 74011250637 HC RX REV CODE- 250/637

## 2022-08-19 PROCEDURE — 74011250636 HC RX REV CODE- 250/636

## 2022-08-19 PROCEDURE — 74011000258 HC RX REV CODE- 258: Performed by: MIDWIFE

## 2022-08-19 PROCEDURE — 75410000003 HC RECOV DEL/VAG/CSECN EA 0.5 HR

## 2022-08-19 PROCEDURE — 74011000250 HC RX REV CODE- 250: Performed by: REGISTERED NURSE

## 2022-08-19 PROCEDURE — 74011000258 HC RX REV CODE- 258

## 2022-08-19 PROCEDURE — 75410000000 HC DELIVERY VAGINAL/SINGLE

## 2022-08-19 RX ORDER — AMOXICILLIN 250 MG
1 CAPSULE ORAL
Status: DISCONTINUED | OUTPATIENT
Start: 2022-08-19 | End: 2022-08-20 | Stop reason: HOSPADM

## 2022-08-19 RX ORDER — HYDROCORTISONE 25 MG/G
CREAM TOPICAL AS NEEDED
Status: DISCONTINUED | OUTPATIENT
Start: 2022-08-19 | End: 2022-08-20 | Stop reason: HOSPADM

## 2022-08-19 RX ORDER — IBUPROFEN 400 MG/1
800 TABLET ORAL
Status: DISCONTINUED | OUTPATIENT
Start: 2022-08-19 | End: 2022-08-20 | Stop reason: HOSPADM

## 2022-08-19 RX ORDER — FENTANYL CITRATE 50 UG/ML
INJECTION, SOLUTION INTRAMUSCULAR; INTRAVENOUS AS NEEDED
Status: DISCONTINUED | OUTPATIENT
Start: 2022-08-19 | End: 2022-08-19 | Stop reason: HOSPADM

## 2022-08-19 RX ORDER — HYDROMORPHONE HYDROCHLORIDE 2 MG/1
1 TABLET ORAL
Status: DISCONTINUED | OUTPATIENT
Start: 2022-08-19 | End: 2022-08-20 | Stop reason: HOSPADM

## 2022-08-19 RX ORDER — ACETAMINOPHEN 325 MG/1
650 TABLET ORAL
Status: DISCONTINUED | OUTPATIENT
Start: 2022-08-19 | End: 2022-08-20 | Stop reason: HOSPADM

## 2022-08-19 RX ORDER — LIDOCAINE HYDROCHLORIDE 10 MG/ML
INJECTION INFILTRATION; PERINEURAL AS NEEDED
Status: DISCONTINUED | OUTPATIENT
Start: 2022-08-19 | End: 2022-08-19 | Stop reason: HOSPADM

## 2022-08-19 RX ORDER — LIDOCAINE HYDROCHLORIDE AND EPINEPHRINE 15; 5 MG/ML; UG/ML
INJECTION, SOLUTION EPIDURAL AS NEEDED
Status: DISCONTINUED | OUTPATIENT
Start: 2022-08-19 | End: 2022-08-19 | Stop reason: HOSPADM

## 2022-08-19 RX ORDER — LANOLIN ALCOHOL/MO/W.PET/CERES
3 CREAM (GRAM) TOPICAL
Status: DISCONTINUED | OUTPATIENT
Start: 2022-08-19 | End: 2022-08-20 | Stop reason: HOSPADM

## 2022-08-19 RX ORDER — PROMETHAZINE HYDROCHLORIDE 25 MG/ML
25 INJECTION, SOLUTION INTRAMUSCULAR; INTRAVENOUS
Status: DISCONTINUED | OUTPATIENT
Start: 2022-08-19 | End: 2022-08-20 | Stop reason: HOSPADM

## 2022-08-19 RX ADMIN — LIDOCAINE HYDROCHLORIDE 5 ML: 10 INJECTION, SOLUTION INFILTRATION; PERINEURAL at 00:06

## 2022-08-19 RX ADMIN — IBUPROFEN 800 MG: 400 TABLET, FILM COATED ORAL at 07:00

## 2022-08-19 RX ADMIN — FENTANYL CITRATE 100 MCG: 50 INJECTION, SOLUTION INTRAMUSCULAR; INTRAVENOUS at 00:35

## 2022-08-19 RX ADMIN — FENTANYL CITRATE 100 MCG: 50 INJECTION INTRAMUSCULAR; INTRAVENOUS at 00:35

## 2022-08-19 RX ADMIN — SODIUM CHLORIDE 2.5 MILLION UNITS: 9 INJECTION, SOLUTION INTRAVENOUS at 00:47

## 2022-08-19 RX ADMIN — HYDROMORPHONE HYDROCHLORIDE 1 MG: 2 TABLET ORAL at 18:22

## 2022-08-19 RX ADMIN — IBUPROFEN 800 MG: 400 TABLET, FILM COATED ORAL at 16:43

## 2022-08-19 RX ADMIN — HYDROMORPHONE HYDROCHLORIDE 1 MG: 2 TABLET ORAL at 13:40

## 2022-08-19 RX ADMIN — LIDOCAINE HYDROCHLORIDE,EPINEPHRINE BITARTRATE 2 ML: 15; .005 INJECTION, SOLUTION EPIDURAL; INFILTRATION; INTRACAUDAL; PERINEURAL at 00:36

## 2022-08-19 RX ADMIN — LIDOCAINE HYDROCHLORIDE,EPINEPHRINE BITARTRATE 3 ML: 15; .005 INJECTION, SOLUTION EPIDURAL; INFILTRATION; INTRACAUDAL; PERINEURAL at 00:35

## 2022-08-19 RX ADMIN — Medication 10000 MILLI-UNITS: at 03:12

## 2022-08-19 RX ADMIN — HYDROMORPHONE HYDROCHLORIDE 1 MG: 2 TABLET ORAL at 09:34

## 2022-08-19 RX ADMIN — Medication 12 ML/HR: at 00:37

## 2022-08-19 RX ADMIN — ROPIVACAINE HYDROCHLORIDE 12 ML/HR: 5 INJECTION, SOLUTION EPIDURAL; INFILTRATION; PERINEURAL at 00:37

## 2022-08-19 NOTE — ANESTHESIA PREPROCEDURE EVALUATION
Relevant Problems   CARDIOVASCULAR   (+) Gestational hypertension   (+) Preeclampsia       Anesthetic History   No history of anesthetic complications            Review of Systems / Medical History  Patient summary reviewed, nursing notes reviewed and pertinent labs reviewed    Pulmonary                   Neuro/Psych         Psychiatric history (anxiety, depression)     Cardiovascular    Hypertension (gestational hypertension)                Comments: Endocarditis in 2019; in hospital with PICC line for 6 weeks receiving antibiotics. VSD at birth but resolved on its own   GI/Hepatic/Renal             Pertinent negatives: GERD: during pregnancy. Endo/Other             Other Findings   Comments: Hepatitis C  H/o IV drug use. Last drug use Jan 21, 2020  Smoked until approx 4 months pregnant, but not currently  Piercing to tongue  Scoliosis to lower back         Physical Exam    Airway  Mallampati: II  TM Distance: 4 - 6 cm  Neck ROM: normal range of motion   Mouth opening: Normal     Cardiovascular    Rhythm: regular  Rate: normal         Dental  No notable dental hx       Pulmonary  Breath sounds clear to auscultation               Abdominal  GI exam deferred       Other Findings            Anesthetic Plan    ASA: 2  Anesthesia type: epidural      Post-op pain plan if not by surgeon: indwelling epidural catheter and epidural opioid      Anesthetic plan and risks discussed with: Patient and Spouse      Risks of epidural discussed, including, but not limited to:  Headache, back pain, infection, nerve injury, seizures, hemodynamic changes, hematoma, failed epidural.  Patient wishes to proceed.

## 2022-08-19 NOTE — PROGRESS NOTES
1615 Received care of mother in room, no distress, call bell in reach,  encouraged  L side lying, family @ bedside  1800 tolerated diet  2145 feeding infant,      2215   assessment completed,  encouraged sleep  2300 BEDSIDE_VERBAL_RECORDED_WRITTEN: shift change report given to JAG Syed rn (oncoming nurse) by miriam Gonzalez (offgoing nurse). Report given with SBAR, Kardex and MAR.  No changes

## 2022-08-19 NOTE — ROUTINE PROCESS
0000. Anesthesia at bedside    0005: In position for epidural    0006: Epidural time out    0033: Epidural catheter placed    0035: Handed Fentanyl 100mcg/ml to S. May-such, CRNA to admin in epidural    0035: Epidural test dose    0042:  To side tilt

## 2022-08-19 NOTE — PROGRESS NOTES
Problem: Patient Education: Go to Patient Education Activity  Goal: Patient/Family Education  Outcome: Progressing Towards Goal     Problem: Vaginal Delivery: Day of Deliver-Laboring  Goal: Off Pathway (Use only if patient is Off Pathway)  Outcome: Progressing Towards Goal  Goal: Activity/Safety  Outcome: Progressing Towards Goal  Goal: Consults, if ordered  Outcome: Progressing Towards Goal  Goal: Diagnostic Test/Procedures  Outcome: Progressing Towards Goal  Goal: Nutrition/Diet  Outcome: Progressing Towards Goal  Goal: Discharge Planning  Outcome: Progressing Towards Goal  Goal: Medications  Outcome: Progressing Towards Goal  Goal: Respiratory  Outcome: Progressing Towards Goal  Goal: Treatments/Interventions/Procedures  Outcome: Progressing Towards Goal  Goal: *Vital signs within defined limits  Outcome: Progressing Towards Goal  Goal: *Labs within defined limits  Outcome: Progressing Towards Goal  Goal: *Hemodynamically stable  Outcome: Progressing Towards Goal  Goal: *Optimal pain control at patient's stated goal  Outcome: Progressing Towards Goal     Problem: Vaginal Delivery: Day of Delivery-Post delivery  Goal: Off Pathway (Use only if patient is Off Pathway)  Outcome: Progressing Towards Goal  Goal: Activity/Safety  Outcome: Progressing Towards Goal  Goal: Consults, if ordered  Outcome: Progressing Towards Goal  Goal: Nutrition/Diet  Outcome: Progressing Towards Goal  Goal: Discharge Planning  Outcome: Progressing Towards Goal  Goal: Medications  Outcome: Progressing Towards Goal  Goal: Treatments/Interventions/Procedures  Outcome: Progressing Towards Goal  Goal: *Vital signs within defined limits  Outcome: Progressing Towards Goal  Goal: *Labs within defined limits  Outcome: Progressing Towards Goal  Goal: *Hemodynamically stable  Outcome: Progressing Towards Goal  Goal: *Optimal pain control at patient's stated goal  Outcome: Progressing Towards Goal  Goal: *Participates in infant care  Outcome: Progressing Towards Goal  Goal: *Demonstrates progressive activity  Outcome: Progressing Towards Goal  Goal: *Tolerating diet  Outcome: Progressing Towards Goal     Problem: Pain  Goal: *Control of Pain  Outcome: Progressing Towards Goal

## 2022-08-19 NOTE — ANESTHESIA PROCEDURE NOTES
Epidural Block    Patient location during procedure: OB  Start time: 8/19/2022 12:06 AM  End time: 8/19/2022 12:35 AM  Reason for block: labor epidural  Staffing  Performed: CRNA   Resident/CRNA: Stan Arauz CRNA  Preanesthetic Checklist  Completed: patient identified, IV checked, site marked, risks and benefits discussed, surgical consent, monitors and equipment checked, pre-op evaluation, timeout performed and fire risk safety assessment completed and verbalized  Block Placement  Patient position: sitting  Sterility prep: mask, hand, gloves, drape and cap  Sedation level: no sedation  Patient monitoring: heart rate, frequent blood pressure checks and continuous pulse oximetry  Approach: midline  Location: lumbar  Epidural  Loss of resistance technique: saline  Guidance: landmark technique and ultrasound guided  Needle  Needle type: Tuohy   Needle gauge: 17 G  Needle length: 9 cm  Needle insertion depth: 8.5 cm  Catheter type: end hole  Catheter size: 19 G  Catheter at skin depth: 13 cm  Catheter securement method: surgical tape, liquid medical adhesive and clear occlusive dressing  Test dose: negative  Assessment  Block outcome: pain improved  Number of attempts: 3 or more  Procedure assessment: patient tolerated procedure well with no immediate complications  Additional Notes  Patient has scoliosis with a curvature in lower back. Used ultrasound to assist with challenging  insertion.

## 2022-08-19 NOTE — L&D DELIVERY NOTE
Vaginal Delivery Procedure Note    Name: New Glass   Medical Record Number: [id number]   YOB: 1995  Today's Date: 2022    Procedure: VAGINAL DELIVERY     Anesthesia: no       Type - 1% xylocaine local:yes  Epidural: yes    Extra Procedure Details:       Estimated Blood Loss: 150 ccs    Fetal Description:  female     Anterior shoulder: left    Episiotomy: no   Tear: yes  Degree: bilat periurethral             Cord Blood Results:   Information for the patient's :  Mecca Interiano [[id number]]   No components found for: ABG       Apgars: 8/9    Birth Information:   Information for the patient's :  Mecca Interiano [[id number]]         Placenta: delivered spontaneously at 0315, appears intact, 3 vessel cord    Specimens: Placenta was sent           Complications:  none     Birth Weight: not assessed at time of note    Mother's Condition: good  Baby's Condition: good    Pt feeling urge to push and was found to be 10/100/+1. Pt started pushing adequately with coaching. Fetal head crowned and head delivered quickly after in OA position which then restituted to LAZARO. No cord was noted and the anterior shoulder followed without difficulty, [facility] was [facility] at [facility] and placed on the maternal abdomen skin to skin. Pitocin IV was started for active third stage management. Cord was clamped after 3 mins when pulsation ceased and cut by the FOB. Cord blood was collected and pitocin. Placenta was delivered intact in Cheryl[place] position at  [phone]. Fundus was firm at U-1. Perineum was inspected and a bilateral periurethral tear was noted which was repaired with 3.0 vicryl suture and was then hemostatic. . Counts correct x 3 . Placenta noted to have marginal/velamentous cord insertion as well as significant calcifications with a hx of covid in this pregnancy and sent to pathology. Mom and baby stable and bonding. I was present for the delivery.     Signed: Elsi Miller Garrett, LALO      August 19, 2022

## 2022-08-19 NOTE — PROGRESS NOTES
0720- Bedside and Verbal shift change report given to Ember (oncoming nurse) by Arron Michael (offgoing nurse). Report included the following information SBAR, Intake/Output, MAR, and Recent Results. 9461- shift assessment complete    0934- pt medicated per MAR    1100- pt up in restroom    1347- pt assisted with breastfeeding    1440- pt resting in bed    1510- Bedside and Verbal shift change report given to TeresaO Gov Baldem[address] (oncoming nurse) by Bogdan Tan (offgoing nurse). Report included the following information SBAR, Intake/Output, MAR, and Recent Results.

## 2022-08-19 NOTE — PROGRESS NOTES
Labor Progress Note    Patient seen, fetal heart rate and contraction pattern evaluated. Physical Exam:  Pelvic: Cervix 4-5,   Effaced: 90%  Station:  -1     Artificial Rupture of Membranes; Amniotic Fluid: small amount of clear fluid  Contractions: Every 2-3 minutes, moderate  Fetal Heart Rate: Baseline: 120 per minute  Variability: moderate  Accelerations: yes  Decelerations: none  Cat I FHR strip     Assessment:  Early latent labor.     PLAN:  Reassuring fetal status, Labor  Progressing normally  Continue expectant management, Continue plan for vaginal delivery:  AROM, clear fluid;  Pitocin per protocol    rTacey Flor CNM  8/18/2022  11:38 PM

## 2022-08-19 NOTE — PROGRESS NOTES
TRANSFER - IN REPORT:    Verbal report received from SATHISH Corona RN(name) on Yvonne Blush  being received from L&D(unit) for routine progression of care      Report consisted of patients Situation, Background, Assessment and   Recommendations(SBAR). Information from the following report(s) SBAR, Procedure Summary, Intake/Output, MAR, and Recent Results was reviewed with the receiving nurse, Edilia Mejias RN. Opportunity for questions and clarification was provided. Assessment completed upon patients arrival to unit and care assumed. 0720 Bedside and Verbal shift change report given to LILIA Simmons RN (oncoming nurse) by Edilia Mejias RN (offgoing nurse). Report included the following information SBAR, Kardex, Intake/Output, MAR, and Recent Results.

## 2022-08-19 NOTE — PROGRESS NOTES
1930 Bedside and Verbal shift change report given to SATHISH Boyd RN (oncoming nurse) by Nathen Mason RN (offgoing nurse). Report included the following information SBAR, Kardex, MAR, and Recent Results. 2335 Bedside eval with JOSSELINE Tucker CNM. AROM with FSE, clear fluid and FSE placed at this time. 0000 Pt requests epidural. Anesthesia paged and responded. 0132 This nurse at bedside to evaluate decels. Pt reports feeling pressure with contractions. SVE 7-8cm at this time. Pt position change to high fowlers with resulting reassuring fhr. Will continue to monitor. 0305 Pt continues to push during contractions. This RN and CNM remain at bedside providing continuous labor support, continuously assessing patient, vital signs, fetal heart rate, contraction pattern, progress of labor and descent, adjusting EFM and palpating abdomen and contractions as needed. 0308  of viable baby girl, placed on mothers chest with spontaneous cry    0450 Pt up to void, unsuccessful. Pt ambulated back to bed.    0645 Verbal Bedside report given to Rafaela Sheppard RN on this patient received from L&D (unit) for routine progression of care       Report consisted of patients Situation, Background, Assessment and  Recommendations(SBAR) was reviewed with the receiving nurse. Opportunity for questions and clarification was provided.

## 2022-08-20 VITALS
DIASTOLIC BLOOD PRESSURE: 92 MMHG | OXYGEN SATURATION: 99 % | RESPIRATION RATE: 16 BRPM | HEIGHT: 64 IN | BODY MASS INDEX: 35 KG/M2 | TEMPERATURE: 97.8 F | SYSTOLIC BLOOD PRESSURE: 130 MMHG | HEART RATE: 69 BPM | WEIGHT: 205 LBS

## 2022-08-20 LAB
HCT VFR BLD AUTO: 27.6 % (ref 35–45)
HGB BLD-MCNC: 8.8 G/DL (ref 12–16)

## 2022-08-20 PROCEDURE — 36415 COLL VENOUS BLD VENIPUNCTURE: CPT

## 2022-08-20 PROCEDURE — 74011250637 HC RX REV CODE- 250/637

## 2022-08-20 PROCEDURE — 74011250637 HC RX REV CODE- 250/637: Performed by: ADVANCED PRACTICE MIDWIFE

## 2022-08-20 PROCEDURE — 85018 HEMOGLOBIN: CPT

## 2022-08-20 RX ORDER — LANOLIN ALCOHOL/MO/W.PET/CERES
325 CREAM (GRAM) TOPICAL 2 TIMES DAILY WITH MEALS
Qty: 60 TABLET | Refills: 3 | Status: SHIPPED | OUTPATIENT
Start: 2022-08-20

## 2022-08-20 RX ORDER — LANOLIN ALCOHOL/MO/W.PET/CERES
1 CREAM (GRAM) TOPICAL 2 TIMES DAILY WITH MEALS
Status: DISCONTINUED | OUTPATIENT
Start: 2022-08-20 | End: 2022-08-20 | Stop reason: HOSPADM

## 2022-08-20 RX ORDER — IBUPROFEN 800 MG/1
800 TABLET ORAL
Qty: 60 TABLET | Refills: 1 | Status: SHIPPED | OUTPATIENT
Start: 2022-08-20

## 2022-08-20 RX ADMIN — HYDROMORPHONE HYDROCHLORIDE 1 MG: 2 TABLET ORAL at 05:04

## 2022-08-20 RX ADMIN — HYDROMORPHONE HYDROCHLORIDE 1 MG: 2 TABLET ORAL at 13:35

## 2022-08-20 RX ADMIN — HYDROMORPHONE HYDROCHLORIDE 1 MG: 2 TABLET ORAL at 09:07

## 2022-08-20 RX ADMIN — IBUPROFEN 800 MG: 400 TABLET, FILM COATED ORAL at 11:19

## 2022-08-20 NOTE — PROGRESS NOTES
Problem: Patient Education: Go to Patient Education Activity  Goal: Patient/Family Education  Outcome: Resolved/Met     Problem: Vaginal Delivery: Postpartum Day 1  Goal: Off Pathway (Use only if patient is Off Pathway)  Outcome: Resolved/Met  Goal: Activity/Safety  Outcome: Resolved/Met  Goal: Consults, if ordered  Outcome: Resolved/Met  Goal: Diagnostic Test/Procedures  Outcome: Resolved/Met  Goal: Nutrition/Diet  Outcome: Resolved/Met  Goal: Discharge Planning  Outcome: Resolved/Met  Goal: Medications  Outcome: Resolved/Met  Goal: Treatments/Interventions/Procedures  Outcome: Resolved/Met  Goal: Psychosocial  Outcome: Resolved/Met  Goal: *Vital signs within defined limits  Outcome: Resolved/Met  Goal: *Labs within defined limits  Outcome: Resolved/Met  Goal: *Hemodynamically stable  Outcome: Resolved/Met  Goal: *Optimal pain control at patient's stated goal  Outcome: Resolved/Met  Goal: *Participates in infant care  Outcome: Resolved/Met  Goal: *Demonstrates progressive activity  Outcome: Resolved/Met  Goal: *Performs self perineal care  Outcome: Resolved/Met  Goal: *Appropriate parent-infant bonding  Outcome: Resolved/Met  Goal: *Tolerating diet  Outcome: Resolved/Met  Goal: *Performs self breast care  Outcome: Resolved/Met     Problem: Vaginal Delivery: Postpartum 2  Goal: Off Pathway (Use only if patient is Off Pathway)  Outcome: Resolved/Met  Goal: Activity/Safety  Outcome: Resolved/Met  Goal: Consults, if ordered  Outcome: Resolved/Met  Goal: Nutrition/Diet  Outcome: Resolved/Met  Goal: Discharge Planning  Outcome: Resolved/Met  Goal: Medications  Outcome: Resolved/Met  Goal: Treatments/Interventions/Procedures  Outcome: Resolved/Met  Goal: Psychosocial  Outcome: Resolved/Met     Problem: Vaginal Delivery: Discharge Outcomes  Goal: *Verbalizes name, dosage, time, side effects, and number of days to continue medications  Outcome: Resolved/Met  Goal: *Describes available resources and support systems  Outcome: Resolved/Met  Goal: *No signs and symptoms of infection  Outcome: Resolved/Met  Goal: *Birth certificate information completed  Outcome: Resolved/Met  Goal: *Received and verbalizes understanding of discharge plan and instructions  Outcome: Resolved/Met  Goal: *Vital signs within defined limits  Outcome: Resolved/Met  Goal: *Labs within defined limits  Outcome: Resolved/Met  Goal: *Hemodynamically stable  Outcome: Resolved/Met  Goal: *Optimal pain control at patient's stated goal  Outcome: Resolved/Met  Goal: *Participates in infant care  Outcome: Resolved/Met  Goal: *Demonstrates progressive activity  Outcome: Resolved/Met  Goal: *Appropriate parent-infant bonding  Outcome: Resolved/Met  Goal: *Tolerating diet  Outcome: Resolved/Met     Problem: Pain  Goal: *Control of Pain  Outcome: Resolved/Met     Problem: Falls - Risk of  Goal: *Absence of Falls  Description: Document Alexis Fall Risk and appropriate interventions in the flowsheet.   Outcome: Resolved/Met  Note: Fall Risk Interventions:                                Problem: Patient Education: Go to Patient Education Activity  Goal: Patient/Family Education  Outcome: Resolved/Met

## 2022-08-20 NOTE — PROGRESS NOTES
0720 Bedside and Verbal shift change report given to DARIUSZ Snyder RN (oncoming nurse) by Mushtaq Ramirez RN (offgoing nurse). Report included the following information SBAR, Kardex, Intake/Output, and MAR.     [id number] Assessment completed at this time. Dilaudid administered at this time. IV removed. 1119 Motrin administered at this time. 1335 Dilaudid administered at this time. 1345 D/C teaching complete and copy of D/C teaching instruction given to pt with verbal understanding. Pt given opportunity for questions and denies comments/concerns/questions at this time.

## 2022-08-20 NOTE — DISCHARGE SUMMARY
Obstetrical Discharge Summary     Name: Hari Singer MRN: [id number]  SSN: [id number]    YOB: 1995  Age: 32 y.o. Sex: female      Admit Date: 2022    Discharge Date: 2022     Admitting Physician: Marbin Calloway MD     Attending Physician:  Torrie Gao MD     Admission Diagnoses: IUP (intrauterine pregnancy), incidental [Z33.1]  38 weeks gestation of pregnancy [Z3A.38]  Preeclampsia [O14.90]  Gestational hypertension [O13.9]    Discharge Diagnoses:   Information for the patient's :  Edvin Bustos [[id number]]   Delivery of a 3.09 kg female infant via Vaginal, Spontaneous on 2022 at 3:08 AM  by Tierney Epperson. Apgars were 8  and 9 . Additional Diagnoses:   Hospital Problems  Date Reviewed: 2019            Codes Class Noted POA    Preeclampsia ICD-10-CM: O14.90  ICD-9-CM: 642.40  2022 Unknown        IUP (intrauterine pregnancy), incidental ICD-10-CM: Z33.1  ICD-9-CM: V22.2  2022 Unknown        38 weeks gestation of pregnancy ICD-10-CM: Z3A.38  ICD-9-CM: V22.2  2022 Unknown        Gestational hypertension ICD-10-CM: O13.9  ICD-9-CM: 642.30  2022 Unknown          Lab Results   Component Value Date/Time    Rubella, External immune 2022 12:00 AM    GrBStrep, External positive 08/10/2022 12:00 AM       Immunization(s): There is no immunization history for the selected administration types on file for this patient. Hospital Course: Normal hospital course following the delivery. Patient Instructions:   Current Discharge Medication List        START taking these medications    Details   ferrous sulfate 325 mg (65 mg iron) tablet Take 1 Tablet by mouth two (2) times daily (with meals). Qty: 60 Tablet, Refills: 3      ibuprofen (MOTRIN) 800 mg tablet Take 1 Tablet by mouth every eight (8) hours as needed (Pain scale 4-6).   Qty: 60 Tablet, Refills: 1           CONTINUE these medications which have NOT CHANGED    Details diphenhydrAMINE (BENADRYL) 25 mg tablet Take 25 mg by mouth every six (6) hours as needed for Itching. calcium carbonate (TUMS) 200 mg calcium (500 mg) chew Take 1 Tablet by mouth as needed. Indications: heartburn      PNV Comb #2-Iron-FA-Omega 3 29-1-400 mg cmpk Take  by mouth.      busPIRone (BUSPAR) 10 mg tablet Take 10 mg by mouth two (2) times a day. acetaminophen (TYLENOL) 500 mg tablet Take 2 Tabs by mouth every eight (8) hours as needed for Pain. Qty: 60 Tab, Refills: 0             Reference my discharge instructions.     Follow-up Appointments   Procedures    FOLLOW UP VISIT Appointment in: 6 Weeks     Standing Status:   Standing     Number of Occurrences:   1     Order Specific Question:   Appointment in     Answer:   6 Weeks        Signed By:  Ave Bergman CNM     August 20, 2022

## 2022-08-20 NOTE — PROGRESS NOTES
Progress Note    Patient: Maria Antonia Espana MRN: [id number]  SSN: [id number]    YOB: 1995  Age: 32 y.o. Sex: female      Subjective:     Postpartum Day: 1 Vaginal Delivery    The patient is without complaints. The patient is ambulating well. The patient is tolerating a normal diet. The baby is well. She is breast and bottle feeding. She would like early discharge home today if baby can go. Objective:      Patient Vitals for the past 8 hrs:   BP Temp Pulse Resp SpO2   08/20/22 0903 (!) 130/92 97.8 °F (36.6 °C) 69 16 99 %   08/20/22 0530 132/85 97.7 °F (36.5 °C) 77 19 100 %     LABS: Recent Results (from the past 24 hour(s))   HGB & HCT    Collection Time: 08/20/22  5:04 AM   Result Value Ref Range    HGB 8.8 (L) 12.0 - 16.0 g/dL    HCT 27.6 (L) 35.0 - 45.0 %       Lab Results   Component Value Date/Time    HGB 8.8 (L) 08/20/2022 05:04 AM     Lab Results   Component Value Date/Time    HCT 27.6 (L) 08/20/2022 05:04 AM      Lochia:  appropriate   Uterine Fundus:   firm, 0     Lab/Data Review: All lab results for the last 24 hours reviewed. Assessment:     Status post: Doing well postpartum vaginal delivery day 1. Anemia: Ferrous sulfate PO BID started. Plan:     Continue day 1 post-vaginal delivery orders. Postpartum care discussed including diet, ambulation, and actvitiy restrictions. Plan for discharge home today with follow-up at 6 weeks postpartum.      Signed By: Tasia Shah CNM     August 20, 2022

## 2022-08-20 NOTE — PROGRESS NOTES
0120- Bedside and Verbal shift change report given to Pilar Florence RN (oncoming nurse) by Wilman Rivas RN (offgoing nurse). Report included the following information SBAR, Intake/Output, MAR, and Recent Results. 0720- Bedside and Verbal shift change report given to Miko aCrlton RN (oncoming nurse) by Pilar Florence RN (offgoing nurse). Report included the following information SBAR, Intake/Output, MAR, and Recent Results.

## 2022-08-20 NOTE — DISCHARGE INSTRUCTIONS
POST DELIVERY DISCHARGE INSTRUCTIONS    Name: Clarke Treviño  YOB: 1995  Primary Diagnosis: Active Problems:    Preeclampsia (8/17/2022)      IUP (intrauterine pregnancy), incidental (8/17/2022)      38 weeks gestation of pregnancy (8/17/2022)      Gestational hypertension (8/17/2022)      General:   Diet/Diet Restrictions:  Eight 8-ounce glasses of fluid daily (water, juices); avoid excessive caffeine intake. Meals/snacks as desired which are high in fiber and carbohydrates and low in fat and cholesterol. Physical Activity / Restrictions / Safety:   Avoid heavy lifting, no more that 8 lbs. For 2-3 weeks; limit use of stairs to 2 times daily for the first week home. No driving for one week. Avoid intercourse 4-6 weeks, no douching or tampon use. Check with obstetrician before starting or resuming an exercise program.     Discharge Instructions/Special Treatment/Home Care Needs:   Continue prenatal vitamins. Continue to use squirt bottle with warm water on your episiotomy after each bathroom use until bleeding stops. Call your doctor for the following:   Fever over 101 degrees by mouth. Vaginal bleeding heavier than a normal menstrual period or clot larger than a golf ball. Red streaks or increased swelling of legs, painful red streaks on your breast.  Painful urination, constipation and increased pain or swelling or discharge with your incision. If you feel extremely anxious or overwhelmed. If you have thoughts of harming yourself and/or your baby. Pain Management:   Pain Management:   Take Acetaminophen (Tylenol) or Ibuprofen (Advil, Motrin), as directed for pain. Use a warm Sitz bath 3 times daily to relieve episiotomy or hemorrhoidal discomfort. For hemorrhoidal discomfort, use Tucks and Anusol cream as needed and directed. Follow-Up Care:      These are general instructions for a healthy lifestyle:    No smoking/ No tobacco products/ Avoid exposure to second hand smoke    Surgeon General's Warning:  Quitting smoking now greatly reduces serious risk to your health.     Obesity, smoking, and sedentary lifestyle greatly increases your risk for illness    A healthy diet, regular physical exercise & weight monitoring are important for maintaining a healthy lifestyle

## 2022-08-20 NOTE — PROGRESS NOTES
Problem: Patient Education: Go to Patient Education Activity  Goal: Patient/Family Education  Outcome: Progressing Towards Goal     Problem: Vaginal Delivery: Postpartum Day 1  Goal: Off Pathway (Use only if patient is Off Pathway)  Outcome: Progressing Towards Goal  Goal: Activity/Safety  Outcome: Progressing Towards Goal  Goal: Consults, if ordered  Outcome: Progressing Towards Goal  Goal: Diagnostic Test/Procedures  Outcome: Progressing Towards Goal  Goal: Nutrition/Diet  Outcome: Progressing Towards Goal  Goal: Discharge Planning  Outcome: Progressing Towards Goal  Goal: Medications  Outcome: Progressing Towards Goal  Goal: Treatments/Interventions/Procedures  Outcome: Progressing Towards Goal  Goal: Psychosocial  Outcome: Progressing Towards Goal  Goal: *Vital signs within defined limits  Outcome: Progressing Towards Goal  Goal: *Labs within defined limits  Outcome: Progressing Towards Goal  Goal: *Hemodynamically stable  Outcome: Progressing Towards Goal  Goal: *Optimal pain control at patient's stated goal  Outcome: Progressing Towards Goal  Goal: *Participates in infant care  Outcome: Progressing Towards Goal  Goal: *Demonstrates progressive activity  Outcome: Progressing Towards Goal  Goal: *Performs self perineal care  Outcome: Progressing Towards Goal  Goal: *Appropriate parent-infant bonding  Outcome: Progressing Towards Goal  Goal: *Tolerating diet  Outcome: Progressing Towards Goal  Goal: *Performs self breast care  Outcome: Progressing Towards Goal     Problem: Vaginal Delivery: Postpartum 2  Goal: Off Pathway (Use only if patient is Off Pathway)  Outcome: Progressing Towards Goal  Goal: Activity/Safety  Outcome: Progressing Towards Goal  Goal: Consults, if ordered  Outcome: Progressing Towards Goal  Goal: Nutrition/Diet  Outcome: Progressing Towards Goal  Goal: Discharge Planning  Outcome: Progressing Towards Goal  Goal: Medications  Outcome: Progressing Towards Goal  Goal: Treatments/Interventions/Procedures  Outcome: Progressing Towards Goal  Goal: Psychosocial  Outcome: Progressing Towards Goal     Problem: Vaginal Delivery: Discharge Outcomes  Goal: *Verbalizes name, dosage, time, side effects, and number of days to continue medications  Outcome: Progressing Towards Goal  Goal: *Describes available resources and support systems  Outcome: Progressing Towards Goal  Goal: *No signs and symptoms of infection  Outcome: Progressing Towards Goal  Goal: *Birth certificate information completed  Outcome: Progressing Towards Goal  Goal: *Received and verbalizes understanding of discharge plan and instructions  Outcome: Progressing Towards Goal  Goal: *Vital signs within defined limits  Outcome: Progressing Towards Goal  Goal: *Labs within defined limits  Outcome: Progressing Towards Goal  Goal: *Hemodynamically stable  Outcome: Progressing Towards Goal  Goal: *Optimal pain control at patient's stated goal  Outcome: Progressing Towards Goal  Goal: *Participates in infant care  Outcome: Progressing Towards Goal  Goal: *Demonstrates progressive activity  Outcome: Progressing Towards Goal  Goal: *Appropriate parent-infant bonding  Outcome: Progressing Towards Goal  Goal: *Tolerating diet  Outcome: Progressing Towards Goal     Problem: Pain  Goal: *Control of Pain  Outcome: Progressing Towards Goal

## (undated) DEVICE — GARMENT,MEDLINE,DVT,INT,CALF,MED, GEN2: Brand: MEDLINE

## (undated) DEVICE — REM POLYHESIVE ADULT PATIENT RETURN ELECTRODE: Brand: VALLEYLAB

## (undated) DEVICE — DRESSING,GAUZE,XEROFORM,CURAD,1"X8",ST: Brand: CURAD

## (undated) DEVICE — BANDAGE,GAUZE,CONFORMING,2"X75",STRL,LF: Brand: MEDLINE INDUSTRIES, INC.

## (undated) DEVICE — DRAIN PENR 0.25X18IN LTX STRL -- PENROSE LATEX

## (undated) DEVICE — PACK PROCEDURE SURG EXTREMITY CUST

## (undated) DEVICE — Device

## (undated) DEVICE — BANDAGE COMPR W4INXL5YD ELAS CLP CLSR

## (undated) DEVICE — BNDG SOF-FORM 2X75 STRL LF --

## (undated) DEVICE — (D)PREP SKN CHLRAPRP APPL 26ML -- CONVERT TO ITEM 371833

## (undated) DEVICE — STRAP,POSITIONING,KNEE/BODY,FOAM,4X60": Brand: MEDLINE